# Patient Record
Sex: FEMALE | Race: WHITE | NOT HISPANIC OR LATINO | Employment: UNEMPLOYED | URBAN - METROPOLITAN AREA
[De-identification: names, ages, dates, MRNs, and addresses within clinical notes are randomized per-mention and may not be internally consistent; named-entity substitution may affect disease eponyms.]

---

## 2018-04-20 ENCOUNTER — HOSPITAL ENCOUNTER (EMERGENCY)
Facility: HOSPITAL | Age: 48
Discharge: HOME | End: 2018-04-20
Attending: EMERGENCY MEDICINE
Payer: COMMERCIAL

## 2018-04-20 VITALS
BODY MASS INDEX: 35.77 KG/M2 | RESPIRATION RATE: 18 BRPM | WEIGHT: 194.38 LBS | HEIGHT: 62 IN | TEMPERATURE: 98 F | DIASTOLIC BLOOD PRESSURE: 78 MMHG | SYSTOLIC BLOOD PRESSURE: 141 MMHG | OXYGEN SATURATION: 98 % | HEART RATE: 79 BPM

## 2018-04-20 DIAGNOSIS — Z72.0 TOBACCO USE: ICD-10-CM

## 2018-04-20 DIAGNOSIS — I10 ESSENTIAL HYPERTENSION: ICD-10-CM

## 2018-04-20 DIAGNOSIS — R07.89 ATYPICAL CHEST PAIN: Primary | ICD-10-CM

## 2018-04-20 LAB
ALBUMIN SERPL BCP-MCNC: 4.3 G/DL
ALP SERPL-CCNC: 109 U/L
ALT SERPL W/O P-5'-P-CCNC: 54 U/L
ANION GAP SERPL CALC-SCNC: 11 MMOL/L
AST SERPL-CCNC: 28 U/L
BASOPHILS # BLD AUTO: 0.04 K/UL
BASOPHILS NFR BLD: 0.4 %
BILIRUB SERPL-MCNC: 0.4 MG/DL
BNP SERPL-MCNC: <10 PG/ML
BUN SERPL-MCNC: 12 MG/DL
CALCIUM SERPL-MCNC: 10.2 MG/DL
CHLORIDE SERPL-SCNC: 103 MMOL/L
CO2 SERPL-SCNC: 23 MMOL/L
CREAT SERPL-MCNC: 0.7 MG/DL
DIFFERENTIAL METHOD: NORMAL
EOSINOPHIL # BLD AUTO: 0.5 K/UL
EOSINOPHIL NFR BLD: 5.4 %
ERYTHROCYTE [DISTWIDTH] IN BLOOD BY AUTOMATED COUNT: 14 %
EST. GFR  (AFRICAN AMERICAN): >60 ML/MIN/1.73 M^2
EST. GFR  (NON AFRICAN AMERICAN): >60 ML/MIN/1.73 M^2
GLUCOSE SERPL-MCNC: 84 MG/DL
HCT VFR BLD AUTO: 41.7 %
HGB BLD-MCNC: 13.8 G/DL
LYMPHOCYTES # BLD AUTO: 2.6 K/UL
LYMPHOCYTES NFR BLD: 26 %
MCH RBC QN AUTO: 30.3 PG
MCHC RBC AUTO-ENTMCNC: 33.1 G/DL
MCV RBC AUTO: 91 FL
MONOCYTES # BLD AUTO: 0.7 K/UL
MONOCYTES NFR BLD: 6.5 %
NEUTROPHILS # BLD AUTO: 6.2 K/UL
NEUTROPHILS NFR BLD: 61.4 %
PLATELET # BLD AUTO: 333 K/UL
PMV BLD AUTO: 11.5 FL
POTASSIUM SERPL-SCNC: 4 MMOL/L
PROT SERPL-MCNC: 8.6 G/DL
RBC # BLD AUTO: 4.56 M/UL
SODIUM SERPL-SCNC: 137 MMOL/L
TROPONIN I SERPL DL<=0.01 NG/ML-MCNC: <0.006 NG/ML
TROPONIN I SERPL DL<=0.01 NG/ML-MCNC: <0.006 NG/ML
WBC # BLD AUTO: 10.02 K/UL

## 2018-04-20 PROCEDURE — 93005 ELECTROCARDIOGRAM TRACING: CPT

## 2018-04-20 PROCEDURE — 25000003 PHARM REV CODE 250: Performed by: EMERGENCY MEDICINE

## 2018-04-20 PROCEDURE — 80053 COMPREHEN METABOLIC PANEL: CPT

## 2018-04-20 PROCEDURE — 93010 ELECTROCARDIOGRAM REPORT: CPT | Mod: ,,, | Performed by: INTERNAL MEDICINE

## 2018-04-20 PROCEDURE — 84484 ASSAY OF TROPONIN QUANT: CPT

## 2018-04-20 PROCEDURE — 83880 ASSAY OF NATRIURETIC PEPTIDE: CPT

## 2018-04-20 PROCEDURE — 25500020 PHARM REV CODE 255: Performed by: EMERGENCY MEDICINE

## 2018-04-20 PROCEDURE — 63600175 PHARM REV CODE 636 W HCPCS: Performed by: EMERGENCY MEDICINE

## 2018-04-20 PROCEDURE — 85025 COMPLETE CBC W/AUTO DIFF WBC: CPT

## 2018-04-20 PROCEDURE — 99284 EMERGENCY DEPT VISIT MOD MDM: CPT | Mod: 25

## 2018-04-20 PROCEDURE — 99900035 HC TECH TIME PER 15 MIN (STAT)

## 2018-04-20 PROCEDURE — 96374 THER/PROPH/DIAG INJ IV PUSH: CPT

## 2018-04-20 RX ORDER — ONDANSETRON 2 MG/ML
4 INJECTION INTRAMUSCULAR; INTRAVENOUS
Status: COMPLETED | OUTPATIENT
Start: 2018-04-20 | End: 2018-04-20

## 2018-04-20 RX ORDER — FAMOTIDINE 20 MG/1
20 TABLET, FILM COATED ORAL
Status: COMPLETED | OUTPATIENT
Start: 2018-04-20 | End: 2018-04-20

## 2018-04-20 RX ORDER — FAMOTIDINE 20 MG/1
20 TABLET, FILM COATED ORAL 2 TIMES DAILY
Qty: 60 TABLET | Refills: 0 | Status: SHIPPED | OUTPATIENT
Start: 2018-04-20 | End: 2019-12-04

## 2018-04-20 RX ORDER — QUETIAPINE FUMARATE 100 MG/1
TABLET, FILM COATED ORAL
COMMUNITY
End: 2019-12-04

## 2018-04-20 RX ORDER — LOSARTAN POTASSIUM 100 MG/1
100 TABLET ORAL DAILY
COMMUNITY
End: 2019-12-04

## 2018-04-20 RX ORDER — ONDANSETRON 4 MG/1
4 TABLET, ORALLY DISINTEGRATING ORAL EVERY 8 HOURS PRN
Qty: 12 TABLET | Refills: 0 | Status: SHIPPED | OUTPATIENT
Start: 2018-04-20 | End: 2019-12-04

## 2018-04-20 RX ADMIN — IOHEXOL 100 ML: 350 INJECTION, SOLUTION INTRAVENOUS at 05:04

## 2018-04-20 RX ADMIN — ONDANSETRON 4 MG: 2 INJECTION INTRAMUSCULAR; INTRAVENOUS at 03:04

## 2018-04-20 RX ADMIN — FAMOTIDINE 20 MG: 20 TABLET, FILM COATED ORAL at 07:04

## 2018-04-20 RX ADMIN — DICYCLOMINE HYDROCHLORIDE 50 ML: 10 SOLUTION ORAL at 06:04

## 2018-04-20 NOTE — ED NOTES
Pt ambulatory to restroom. Pt resting in bed. NAD, VSS, RR equal and unlabored. Will continue to monitor

## 2018-04-20 NOTE — ED NOTES
LOC: The patient is awake, alert and aware of environment with an appropriate affect, the patient is oriented x 3 and speaking appropriately.  APPEARANCE: Patient resting comfortably and in no acute distress, patient is clean and well groomed, patient's clothing is properly fastened.  HEENT: Brief WNL  SKIN: Brief WNL.   MUSCULOSKELETAL: Brief WNL except pt reports pain to back and left arm   RESPIRATORY: Brief WNL except productive cough   CARDIAC: Brief WNL except left side chest pain/pressure   GASTRO: Brief WNL except nausea   : Brief WNL  Peripheral Vasc: Brief WNL  NEURO: Brief WNL  PSYCH: Brief WNL

## 2018-04-20 NOTE — ED NOTES
Pt is aaoX4, laying on ER stretcher, resp e/u, VSS, nad. Pt is aware of poc, and she denies having any needs at this time. Bed locked in lowest position, side rails up X2, call bell in reach, family at the bedside. Will continue to monitor.

## 2018-04-20 NOTE — ED PROVIDER NOTES
Encounter Date: 2018       History     Chief Complaint   Patient presents with    Chest Pain     cough and congestion for 2 week tx steriods and antibotics . yest upper back pain and today at 6:30am substernal chest pain radiating to left arm , constant  9/10 - nausea     Patient currently presents with chief complaint of chest pain.  This began around 6AM this am and has not let up since then.  This is localized to the substernal region.  Patient reports shortness of breath, denies palpitations,  denies nausea, denies diaphoresis.  Radiation of pain:  left shoulder.  Patient reports aspirin use in the last 24 hours (BC powder). Denies daily use.  Patient denies history of known CAD.  Cardiac risk factors include:  hypertension, obesity, tobacco use.  There is a prior history of PE.          Review of patient's allergies indicates:  No Known Allergies  Past Medical History:   Diagnosis Date    Cervical cancer     History of pulmonary embolus (PE)     Hypertension      Past Surgical History:   Procedure Laterality Date     SECTION      CHOLECYSTECTOMY      HERNIA REPAIR      HYSTERECTOMY       History reviewed. No pertinent family history.  Social History   Substance Use Topics    Smoking status: Current Every Day Smoker     Packs/day: 0.50     Types: Cigarettes    Smokeless tobacco: Never Used    Alcohol use No     Review of Systems   Constitutional: Positive for fatigue. Negative for chills and fever.   HENT: Negative for congestion and rhinorrhea.    Respiratory: Positive for chest tightness and shortness of breath. Negative for cough and wheezing.    Cardiovascular: Positive for chest pain. Negative for palpitations and leg swelling.   Gastrointestinal: Negative for abdominal pain, constipation, diarrhea, nausea and vomiting.   Genitourinary: Negative for dysuria, frequency, urgency, vaginal bleeding and vaginal discharge.   Skin: Negative for color change and rash.   Allergic/Immunologic:  "Negative for immunocompromised state.   Neurological: Negative for dizziness, weakness and numbness.   Hematological: Negative for adenopathy. Does not bruise/bleed easily.   All other systems reviewed and are negative.      Physical Exam     Initial Vitals [04/20/18 1446]   BP Pulse Resp Temp SpO2   138/74 93 20 98.5 °F (36.9 °C) 100 %      MAP       95.33         Vitals:    04/20/18 1446 04/20/18 1454 04/20/18 1641 04/20/18 1821   BP: 138/74  132/61 (!) 146/70   Pulse: 93 88 79 71   Resp: 20 18 18   Temp: 98.5 °F (36.9 °C)      TempSrc: Oral      SpO2: 100%  100% 100%   Weight: 88.2 kg (194 lb 6 oz)      Height: 5' 2" (1.575 m)       04/20/18 1946   BP: (!) 141/78   Pulse: 79   Resp: 18   Temp: 98.1 °F (36.7 °C)   TempSrc: Oral   SpO2: 98%   Weight:    Height:          Physical Exam    Nursing note and vitals reviewed.  Constitutional: She appears well-developed and well-nourished. She is not diaphoretic. No distress.   HENT:   Head: Normocephalic and atraumatic.   Right Ear: External ear normal.   Left Ear: External ear normal.   Nose: Nose normal.   Mouth/Throat: Oropharynx is clear and moist.   Eyes: Conjunctivae and EOM are normal. Pupils are equal, round, and reactive to light. No scleral icterus.   Neck: Neck supple. No tracheal deviation present. No JVD present.   Cardiovascular: Normal rate, regular rhythm, normal heart sounds and intact distal pulses. Exam reveals no gallop and no friction rub.    No murmur heard.  Pulmonary/Chest: Breath sounds normal. No respiratory distress. She has no wheezes. She has no rhonchi. She has no rales.   Abdominal: Soft. Bowel sounds are normal. She exhibits no distension. There is no tenderness.   Musculoskeletal: Normal range of motion. She exhibits no edema.   Neurological: She is alert and oriented to person, place, and time. She has normal strength. No cranial nerve deficit or sensory deficit.   Skin: Skin is warm and dry. No rash noted.   Psychiatric: She has a " normal mood and affect. Her behavior is normal.         ED Course   Procedures  Labs Reviewed   COMPREHENSIVE METABOLIC PANEL - Abnormal; Notable for the following:        Result Value    Total Protein 8.6 (*)     ALT 54 (*)     All other components within normal limits   B-TYPE NATRIURETIC PEPTIDE   CBC W/ AUTO DIFFERENTIAL   TROPONIN I   TROPONIN I     EKG Readings: (Independently Interpreted)   Initial Reading: No STEMI. Rhythm: Normal Sinus Rhythm. Heart Rate: 84. Ectopy: No Ectopy. Conduction: Normal. Axis: Normal.     Imaging Results          CTA Chest Non-Coronary (PE Study) (Final result)  Result time 04/20/18 17:26:45    Final result by Meghan Herrera III, MD (04/20/18 17:26:45)                 Impression:      1. Significantly limited exam due to suboptimal contrast bolus timing.  Pulmonary embolism distal to the main pulmonary arteries cannot be excluded.  No large central pulmonary embolus is identified.  If the patient has a positive d-dimer, VQ scan or a 2nd dose of IV contrast for chest CTA could be performed.   2.  No acute cardiopulmonary disease identified.            Electronically signed by: MEGHAN HERRERA MD  Date:     04/20/18  Time:    17:26              Narrative:    CTA CHEST NON CORONARY    Clinical history: Chest pain, shortness of breath; history of pulmonary embolism.     Technique: Routine CT angiogram of the chest protocol performed after the IV administration of 100 mL Omnipaque 350. 3D reconstructions/reformats/MIPs obtained. All CT scans at this facility use dose modulation, iterative reconstruction, and/or weight based dosing when appropriate to reduce radiation dose to as low as reasonably achievable.    Comparison: No prior chest CT available.    FINDINGS: Significantly limited exam due to suboptimal contrast bolus timing without significant contrast opacification of the segmental and subsegmental pulmonary arteries.  Pulmonary embolism distal to the main pulmonary artery cannot  be excluded.  No large central pulmonary embolus is identified. No aortic aneurysm or dissection is identified.  There is no cardiomegaly or pericardial effusion.  No pathologically enlarged lymph nodes are seen in the chest.  The lungs are grossly clear without evidence of acute infiltrate, effusion, pneumothorax or other acute pulmonary disease.  No acute osseous abnormality is seen. Limited images through the upper abdomen demonstrate no acute findings.  Subcentimeter benign-appearing left adrenal nodules are noted.                             X-Ray Chest AP Portable (Final result)  Result time 04/20/18 15:30:00    Final result by Caleb Hagan MD (04/20/18 15:30:00)                 Impression:       No acute process seen.      Electronically signed by: CALEB HGAAN MD  Date:     04/20/18  Time:    15:30              Narrative:    Clinical Data:Chest pain    Comparison:  none    Findings:  Single view of the chest.      Cardiac silhouette is normal.  The lungs demonstrate no evidence of active disease.  No evidence of pleural effusion or pneumothorax.  Bones appear intact.                            Results for orders placed or performed during the hospital encounter of 04/20/18   Brain natriuretic peptide   Result Value Ref Range    BNP <10 0 - 99 pg/mL   CBC auto differential   Result Value Ref Range    WBC 10.02 3.90 - 12.70 K/uL    RBC 4.56 4.00 - 5.40 M/uL    Hemoglobin 13.8 12.0 - 16.0 g/dL    Hematocrit 41.7 37.0 - 48.5 %    MCV 91 82 - 98 fL    MCH 30.3 27.0 - 31.0 pg    MCHC 33.1 32.0 - 36.0 g/dL    RDW 14.0 11.5 - 14.5 %    Platelets 333 150 - 350 K/uL    MPV 11.5 9.2 - 12.9 fL    Gran # (ANC) 6.2 1.8 - 7.7 K/uL    Lymph # 2.6 1.0 - 4.8 K/uL    Mono # 0.7 0.3 - 1.0 K/uL    Eos # 0.5 0.0 - 0.5 K/uL    Baso # 0.04 0.00 - 0.20 K/uL    Gran% 61.4 38.0 - 73.0 %    Lymph% 26.0 18.0 - 48.0 %    Mono% 6.5 4.0 - 15.0 %    Eosinophil% 5.4 0.0 - 8.0 %    Basophil% 0.4 0.0 - 1.9 %    Differential Method  Automated    Comprehensive metabolic panel   Result Value Ref Range    Sodium 137 136 - 145 mmol/L    Potassium 4.0 3.5 - 5.1 mmol/L    Chloride 103 95 - 110 mmol/L    CO2 23 23 - 29 mmol/L    Glucose 84 70 - 110 mg/dL    BUN, Bld 12 6 - 20 mg/dL    Creatinine 0.7 0.5 - 1.4 mg/dL    Calcium 10.2 8.7 - 10.5 mg/dL    Total Protein 8.6 (H) 6.0 - 8.4 g/dL    Albumin 4.3 3.5 - 5.2 g/dL    Total Bilirubin 0.4 0.1 - 1.0 mg/dL    Alkaline Phosphatase 109 55 - 135 U/L    AST 28 10 - 40 U/L    ALT 54 (H) 10 - 44 U/L    Anion Gap 11 8 - 16 mmol/L    eGFR if African American >60.0 >60 mL/min/1.73 m^2    eGFR if non African American >60.0 >60 mL/min/1.73 m^2   Troponin I   Result Value Ref Range    Troponin I <0.006 0.000 - 0.026 ng/mL   Troponin I   Result Value Ref Range    Troponin I <0.006 0.000 - 0.026 ng/mL          Medical Decision Making:   ED Management:  Following discussion of all pertinent details presently available from the patient's encounter, the patient was transitioned into the care of  for ongoing evaluation and management as of 1800 pending repeat troponin levels.  Sb Zhang MD  5:54 PM        Additional MDM:   Heart Score:    History:          Slightly suspicious.  ECG:             Normal  Age:               Less than 45 years  Risk factors: >= 3 risk factors or history of atherosclerotic disease  Troponin:       Less than or equal to normal limit  Final Score: 2      MIROSLAVA Score:   Age over 65:                                    0.00   > or = to 3 CAD risk factors:           1.00  Established CAD:                            0.00  > or = to 2 anginal events in the past 24 hours: 0.00  Use of ASA in past 7 days:              0.00  Elevated Enzymes:                         0.00  ST Depression > or = to 0.05 mV:  0.00  MIROSLAVA score: 1        18:00 care from Dr. Zhang.       Medications   ondansetron injection 4 mg (4 mg Intravenous Given 4/20/18 4741)   omnipaque 350 iohexol 100 mL (100 mLs  "Intravenous Given 4/20/18 1715)   GI cocktail (mylanta 30 mL, lidocaine 2 % viscous 10 mL, dicyclomine 10 mL) 50 mL (50 mLs Oral Given 4/20/18 1807)   famotidine tablet 20 mg (20 mg Oral Given 4/20/18 1944)       7:36 PM Reassessment: Dr. Kulkarni reassessed the pt.  patient reportedly had some chest discomfort that started this morning when she woke up.  Is been continuous.  She describes it as a "bubble".  There is no radiation.  Patient denies any nausea, vomiting, diaphoresis or shortness of breath with it.  Patient does have prior history of PE.  This was after she was placed on birth control tablets 18 years ago.  Patient denies any pleuritic component of the chest pain, leg pain, leg swelling, prolonged travel, trauma to her legs.  Patient's cardiac risk factors include smoking, obesity, and hypertension.  There is no family history of premature coronary artery disease.  Patient denies any melena, hematochezia, peptic ulcer disease, GERD.  Patient denies any cough, congestion, difficulty breathing, leg pain, leg swelling, diaphoresis, or trauma.  The chest discomfort has been continuous since this morning.  She was given a GI cocktail which improved her pain from a 9 out of 10 to a 6 out of 10.    Gen.: Patient resting in bed.  Appears to be under no acute distress   Heart: Regular rate and rhythm   Lungs: Clear to auscultation   Abdomen: Soft no rebound or guarding no masses   Extremities: No calf pain or calf tenderness   Neuro cranial nerves II through XII intact     The pt is resting comfortably and is NAD.  Pt states their sx have improved.  Discussed findings of CT scan with no pulmonary embolism identified and large pulmonary vasculature.  Recommendations from radiologist were reviewed, however patient had no calf pain, No calf tenderness, prolonged travel, or estrogen replacement.  Current symptoms she states are different from when she had the pulmonary embolism.  There is no tachycardia or hypoxia " noted on vital signs.  Therefore d-dimer/VQ scan/repeat CT not clinically indicated.  I did discuss with the patient to return to the emergency room for worsening chest pain, shortness of breath, diaphoresis, or worsening condition.  Patient was also consult on quitting smoking.  Discussed test results, shared treatment plan, specific conditions for return, and the need for f/u.  Answered their questions at this time.  Pt understands and agrees to the plan.  The pt has remained hemodynamically stable through ED course and is stable for discharge.    Follow-up Information     Primary Care Provider of Choice In 2 days.    Why:  Return to the ED for:  chest pain, chest pressure, shortness of breath, difficulty breathing, breathing out in sweats, blood in stool or other concerns.  Contact information:  [phone] for appointment.     Dr. JOVANY Chavira.                 Discharge Medication List as of 4/20/2018  7:41 PM      START taking these medications    Details   famotidine (PEPCID) 20 MG tablet Take 1 tablet (20 mg total) by mouth 2 (two) times daily., Starting Fri 4/20/2018, Until Sun 5/20/2018, Print      ondansetron (ZOFRAN-ODT) 4 MG TbDL Take 1 tablet (4 mg total) by mouth every 8 (eight) hours as needed., Starting Fri 4/20/2018, Print              Discharge Medication List as of 4/20/2018  7:41 PM          I discussed with patient and/or family/caretaker that evaluation in the ED does not suggest any emergent or life threatening medical conditions requiring immediate intervention beyond what was provided in the ED, and I believe patient is safe for discharge.  Regardless, an unremarkable evaluation in the ED does not preclude the development or presence of a serious of life threatening condition. As such, patient was instructed to return immediately for any worsening or change in current symptoms.    Regarding TOBACCO USE DISORDER, patient was encouraged to:  make changes to lifestyle and habits to help reduce  craving for cigarettes; satisfy oral habits in other ways (eat celery or another low-calorie snack, chew sugarless gum, etc.); only frequent public places and restaurants where smoking is prohibited or restricted; eat regular meals and avoid sweet snacks or candy; and to exercise more frequently.  Patient was educated on setting goals for quitting and complications associated with tobacco use. Resources were provided to patient for smoking cessation opportunities offered in the community.                Clinical Impression:   The primary encounter diagnosis was Atypical chest pain. Diagnoses of Tobacco use and Essential hypertension were also pertinent to this visit.    Disposition:   Disposition: Discharged  Condition: Stable                        Jacque Kulkarni,   04/21/18 0302

## 2018-04-21 NOTE — ED NOTES
Dr. Kulkarni is at the bedside updating pt on test results and poc. Pt has verbalized understanding, and she denies having any further questions or concerns at this time. Pt will be discharged per md order.

## 2018-04-21 NOTE — DISCHARGE INSTRUCTIONS
Avoid red sauces, caffeine, peppermint, peppers,  alcohol, motrin/ibuprofen/advil/NSAIDS, and tobacco.      Eat a bland diet.    Return to the ED for:   Blood in stool, black tarry stool, dizziness, light-headedness, worsening pain, passing out, vomiting blood or other concerns.

## 2019-12-06 PROBLEM — F41.9 ANXIETY: Status: ACTIVE | Noted: 2019-12-06

## 2019-12-06 PROBLEM — E78.5 HYPERLIPIDEMIA: Status: ACTIVE | Noted: 2019-12-06

## 2019-12-06 PROBLEM — I10 BENIGN HYPERTENSION: Status: ACTIVE | Noted: 2019-12-06

## 2019-12-06 PROBLEM — F90.9 ADHD (ATTENTION DEFICIT HYPERACTIVITY DISORDER): Status: ACTIVE | Noted: 2019-12-06

## 2020-03-03 ENCOUNTER — TELEPHONE (OUTPATIENT)
Dept: RADIOLOGY | Facility: HOSPITAL | Age: 50
End: 2020-03-03

## 2020-03-03 PROBLEM — E55.9 VITAMIN D DEFICIENCY: Status: ACTIVE | Noted: 2020-03-03

## 2020-03-05 ENCOUNTER — TELEPHONE (OUTPATIENT)
Dept: RADIOLOGY | Facility: HOSPITAL | Age: 50
End: 2020-03-05

## 2020-03-06 ENCOUNTER — HOSPITAL ENCOUNTER (OUTPATIENT)
Dept: RADIOLOGY | Facility: HOSPITAL | Age: 50
Discharge: HOME | End: 2020-03-06
Attending: FAMILY MEDICINE
Payer: COMMERCIAL

## 2020-03-06 DIAGNOSIS — R11.0 NAUSEA: ICD-10-CM

## 2020-03-06 DIAGNOSIS — R31.9 HEMATURIA, UNSPECIFIED TYPE: ICD-10-CM

## 2020-03-06 DIAGNOSIS — R10.9 ABDOMINAL DISCOMFORT: ICD-10-CM

## 2020-03-06 PROCEDURE — 76700 US EXAM ABDOM COMPLETE: CPT | Mod: TC,PO

## 2020-03-06 PROCEDURE — 76700 US ABDOMEN COMPLETE: ICD-10-PCS | Mod: 26,,, | Performed by: RADIOLOGY

## 2020-03-06 PROCEDURE — 76700 US EXAM ABDOM COMPLETE: CPT | Mod: 26,,, | Performed by: RADIOLOGY

## 2024-11-20 ENCOUNTER — HOSPITAL ENCOUNTER (OUTPATIENT)
Facility: HOSPITAL | Age: 54
Discharge: HOME | End: 2024-11-22
Attending: EMERGENCY MEDICINE | Admitting: HOSPITALIST
Payer: COMMERCIAL

## 2024-11-20 DIAGNOSIS — R19.7 NAUSEA VOMITING AND DIARRHEA: ICD-10-CM

## 2024-11-20 DIAGNOSIS — Z77.29 EXPOSURE TO NATURAL GAS: ICD-10-CM

## 2024-11-20 DIAGNOSIS — R07.9 CHEST PAIN: Primary | ICD-10-CM

## 2024-11-20 DIAGNOSIS — R11.2 NAUSEA VOMITING AND DIARRHEA: ICD-10-CM

## 2024-11-20 DIAGNOSIS — R11.2 INTRACTABLE NAUSEA AND VOMITING: ICD-10-CM

## 2024-11-20 DIAGNOSIS — R25.2 MUSCLE CRAMPS: ICD-10-CM

## 2024-11-20 LAB
ALBUMIN SERPL BCP-MCNC: 4.1 G/DL (ref 3.5–5.2)
ALLENS TEST: ABNORMAL
ALP SERPL-CCNC: 95 U/L (ref 40–150)
ALT SERPL W/O P-5'-P-CCNC: 23 U/L (ref 10–44)
ANION GAP SERPL CALC-SCNC: 9 MMOL/L (ref 8–16)
ASCENDING AORTA: 3.05 CM
AST SERPL-CCNC: 19 U/L (ref 10–40)
AV AREA BY CONTINUOUS VTI: 3.6 CM2
AV INDEX (PROSTH): 1.08
AV LVOT MEAN GRADIENT: 1 MMHG
AV LVOT PEAK GRADIENT: 2 MMHG
AV MEAN GRADIENT: 1.4 MMHG
AV PEAK GRADIENT: 2.6 MMHG
AV VALVE AREA BY VELOCITY RATIO: 3.5 CM²
AV VALVE AREA: 3.7 CM2
AV VELOCITY RATIO: 1
BACTERIA #/AREA URNS AUTO: ABNORMAL /HPF
BASOPHILS # BLD AUTO: 0.04 K/UL (ref 0–0.2)
BASOPHILS NFR BLD: 0.3 % (ref 0–1.9)
BILIRUB SERPL-MCNC: 0.4 MG/DL (ref 0.1–1)
BILIRUB UR QL STRIP: NEGATIVE
BNP SERPL-MCNC: <10 PG/ML (ref 0–99)
BSA FOR ECHO PROCEDURE: 1.87 M2
BUN SERPL-MCNC: 13 MG/DL (ref 6–20)
CALCIUM SERPL-MCNC: 10 MG/DL (ref 8.7–10.5)
CARBOXYHEMOGLOBIN: 3.2 % (ref 1.5–5)
CHLORIDE SERPL-SCNC: 101 MMOL/L (ref 95–110)
CK SERPL-CCNC: 56 U/L (ref 20–180)
CLARITY UR REFRACT.AUTO: ABNORMAL
CO2 SERPL-SCNC: 25 MMOL/L (ref 23–29)
COLOR UR AUTO: YELLOW
CREAT SERPL-MCNC: 0.9 MG/DL (ref 0.5–1.4)
CV ECHO LV RWT: 0.34 CM
CV STRESS BASE HR: 87 BPM
D DIMER PPP IA.FEU-MCNC: 0.57 MG/L FEU
DIASTOLIC BLOOD PRESSURE: 69 MMHG
DIFFERENTIAL METHOD BLD: ABNORMAL
DOP CALC AO PEAK VEL: 0.8 M/S
DOP CALC AO VTI: 11.2 CM
DOP CALC LVOT AREA: 3.5 CM2
DOP CALC LVOT DIAMETER: 2.1 CM
DOP CALC LVOT PEAK VEL: 0.8 M/S
DOP CALC LVOT STROKE VOLUME: 41.9 CM3
DOP CALCLVOT PEAK VEL VTI: 12.1 CM
E WAVE DECELERATION TIME: 229.29 MS
E/A RATIO: 0.67
E/E' RATIO: 6.3 M/S
ECHO EF ESTIMATED: 38 %
ECHO LV POSTERIOR WALL: 0.8 CM (ref 0.6–1.1)
EOSINOPHIL # BLD AUTO: 0.3 K/UL (ref 0–0.5)
EOSINOPHIL NFR BLD: 2.2 % (ref 0–8)
ERYTHROCYTE [DISTWIDTH] IN BLOOD BY AUTOMATED COUNT: 13.4 % (ref 11.5–14.5)
EST. GFR  (NO RACE VARIABLE): >60 ML/MIN/1.73 M^2
FRACTIONAL SHORTENING: 17 % (ref 28–44)
GLUCOSE SERPL-MCNC: 93 MG/DL (ref 70–110)
GLUCOSE UR QL STRIP: NEGATIVE
HCO3 UR-SCNC: 28.9 MMOL/L (ref 24–28)
HCT VFR BLD AUTO: 43.2 % (ref 37–48.5)
HCV AB SERPL QL IA: NORMAL
HGB BLD-MCNC: 14.1 G/DL (ref 12–16)
HGB UR QL STRIP: ABNORMAL
HIV 1+2 AB+HIV1 P24 AG SERPL QL IA: NORMAL
HYALINE CASTS UR QL AUTO: 3 /LPF
IMM GRANULOCYTES # BLD AUTO: 0.05 K/UL (ref 0–0.04)
IMM GRANULOCYTES NFR BLD AUTO: 0.4 % (ref 0–0.5)
INFLUENZA A, MOLECULAR: NEGATIVE
INFLUENZA B, MOLECULAR: NEGATIVE
INTERVENTRICULAR SEPTUM: 0.7 CM (ref 0.6–1.1)
KETONES UR QL STRIP: ABNORMAL
LA MAJOR: 3.17 CM
LA MINOR: 3.87 CM
LA WIDTH: 2.48 CM
LEFT ATRIUM SIZE: 2.72 CM
LEFT ATRIUM VOLUME INDEX MOD: 12.7 ML/M2
LEFT ATRIUM VOLUME INDEX: 11.1 ML/M2
LEFT ATRIUM VOLUME MOD: 22.87 ML
LEFT ATRIUM VOLUME: 19.98 CM3
LEFT INTERNAL DIMENSION IN SYSTOLE: 3.9 CM (ref 2.1–4)
LEFT VENTRICLE DIASTOLIC VOLUME INDEX: 57.89 ML/M2
LEFT VENTRICLE DIASTOLIC VOLUME: 104.21 ML
LEFT VENTRICLE MASS INDEX: 62.5 G/M2
LEFT VENTRICLE SYSTOLIC VOLUME INDEX: 35.6 ML/M2
LEFT VENTRICLE SYSTOLIC VOLUME: 64.11 ML
LEFT VENTRICULAR INTERNAL DIMENSION IN DIASTOLE: 4.7 CM (ref 3.5–6)
LEFT VENTRICULAR MASS: 112.5 G
LEUKOCYTE ESTERASE UR QL STRIP: NEGATIVE
LIPASE SERPL-CCNC: 11 U/L (ref 4–60)
LV LATERAL E/E' RATIO: 4.85
LV SEPTAL E/E' RATIO: 9
LYMPHOCYTES # BLD AUTO: 2.2 K/UL (ref 1–4.8)
LYMPHOCYTES NFR BLD: 18.7 % (ref 18–48)
MCH RBC QN AUTO: 30.5 PG (ref 27–31)
MCHC RBC AUTO-ENTMCNC: 32.6 G/DL (ref 32–36)
MCV RBC AUTO: 94 FL (ref 82–98)
MICROSCOPIC COMMENT: ABNORMAL
MONOCYTES # BLD AUTO: 0.8 K/UL (ref 0.3–1)
MONOCYTES NFR BLD: 6.3 % (ref 4–15)
MV PEAK A VEL: 0.94 M/S
MV PEAK E VEL: 0.63 M/S
NEUTROPHILS # BLD AUTO: 8.6 K/UL (ref 1.8–7.7)
NEUTROPHILS NFR BLD: 72.1 % (ref 38–73)
NITRITE UR QL STRIP: NEGATIVE
NRBC BLD-RTO: 0 /100 WBC
OHS CV CPX 1 MINUTE RECOVERY HEART RATE: 129 BPM
OHS CV CPX 85 PERCENT MAX PREDICTED HEART RATE MALE: 142
OHS CV CPX ESTIMATED METS: 10
OHS CV CPX MAX PREDICTED HEART RATE: 167
OHS CV CPX PATIENT IS FEMALE: 1
OHS CV CPX PATIENT IS MALE: 0
OHS CV CPX PEAK DIASTOLIC BLOOD PRESSURE: 61 MMHG
OHS CV CPX PEAK HEAR RATE: 139 BPM
OHS CV CPX PEAK RATE PRESSURE PRODUCT: ABNORMAL
OHS CV CPX PEAK SYSTOLIC BLOOD PRESSURE: 150 MMHG
OHS CV CPX PERCENT MAX PREDICTED HEART RATE ACHIEVED: 87
OHS CV CPX RATE PRESSURE PRODUCT PRESENTING: ABNORMAL
OHS CV RV/LV RATIO: 0.81 CM
OHS QRS DURATION: 80 MS
OHS QRS DURATION: 80 MS
OHS QRS DURATION: 82 MS
OHS QTC CALCULATION: 442 MS
OHS QTC CALCULATION: 452 MS
OHS QTC CALCULATION: 463 MS
PCO2 BLDA: 46.6 MMHG (ref 35–45)
PH SMN: 7.4 [PH] (ref 7.35–7.45)
PH UR STRIP: 6 [PH] (ref 5–8)
PLATELET # BLD AUTO: 325 K/UL (ref 150–450)
PMV BLD AUTO: 11.6 FL (ref 9.2–12.9)
PO2 BLDA: 27 MMHG (ref 40–60)
POC BE: 4 MMOL/L
POC SATURATED O2: 51 % (ref 95–100)
POC TCO2: 30 MMOL/L (ref 24–29)
POST STRESS EJECTION FRACTION: 75 %
POTASSIUM SERPL-SCNC: 4.3 MMOL/L (ref 3.5–5.1)
PROT SERPL-MCNC: 8 G/DL (ref 6–8.4)
PROT UR QL STRIP: ABNORMAL
RA MAJOR: 3.43 CM
RA PRESSURE ESTIMATED: 3 MMHG
RA WIDTH: 2.86 CM
RBC # BLD AUTO: 4.62 M/UL (ref 4–5.4)
RBC #/AREA URNS AUTO: 4 /HPF (ref 0–4)
RIGHT ATRIAL AREA: 9 CM2
RIGHT VENTRICLE DIASTOLIC BASEL DIMENSION: 3.8 CM
RV TISSUE DOPPLER FREE WALL SYSTOLIC VELOCITY 1 (APICAL 4 CHAMBER VIEW): 12.44 CM/S
SAMPLE: ABNORMAL
SARS-COV-2 RDRP RESP QL NAA+PROBE: NEGATIVE
SINUS: 3.07 CM
SITE: ABNORMAL
SODIUM SERPL-SCNC: 135 MMOL/L (ref 136–145)
SP GR UR STRIP: 1.01 (ref 1–1.03)
SPECIMEN SOURCE: NORMAL
SQUAMOUS #/AREA URNS AUTO: 11 /HPF
STJ: 2.84 CM
STRESS ECHO POST EXERCISE DUR MIN: 6 MINUTES
STRESS ECHO POST EXERCISE DUR SEC: 29 SECONDS
SYSTOLIC BLOOD PRESSURE: 119 MMHG
TDI LATERAL: 0.13 M/S
TDI SEPTAL: 0.07 M/S
TDI: 0.1 M/S
TRICUSPID ANNULAR PLANE SYSTOLIC EXCURSION: 1.96 CM
TROPONIN I SERPL DL<=0.01 NG/ML-MCNC: 0.01 NG/ML (ref 0–0.03)
TROPONIN I SERPL DL<=0.01 NG/ML-MCNC: <0.006 NG/ML (ref 0–0.03)
TROPONIN I SERPL DL<=0.01 NG/ML-MCNC: <0.006 NG/ML (ref 0–0.03)
URN SPEC COLLECT METH UR: ABNORMAL
WBC # BLD AUTO: 11.88 K/UL (ref 3.9–12.7)
WBC #/AREA URNS AUTO: 2 /HPF (ref 0–5)
Z-SCORE OF LEFT VENTRICULAR DIMENSION IN END DIASTOLE: -0.57
Z-SCORE OF LEFT VENTRICULAR DIMENSION IN END SYSTOLE: 1.88

## 2024-11-20 PROCEDURE — 96375 TX/PRO/DX INJ NEW DRUG ADDON: CPT

## 2024-11-20 PROCEDURE — 96361 HYDRATE IV INFUSION ADD-ON: CPT

## 2024-11-20 PROCEDURE — 93005 ELECTROCARDIOGRAM TRACING: CPT

## 2024-11-20 PROCEDURE — 25500020 PHARM REV CODE 255: Performed by: EMERGENCY MEDICINE

## 2024-11-20 PROCEDURE — 87635 SARS-COV-2 COVID-19 AMP PRB: CPT | Performed by: PHYSICIAN ASSISTANT

## 2024-11-20 PROCEDURE — 80053 COMPREHEN METABOLIC PANEL: CPT

## 2024-11-20 PROCEDURE — 25000003 PHARM REV CODE 250: Performed by: PHYSICIAN ASSISTANT

## 2024-11-20 PROCEDURE — 85379 FIBRIN DEGRADATION QUANT: CPT | Performed by: PHYSICIAN ASSISTANT

## 2024-11-20 PROCEDURE — 99900035 HC TECH TIME PER 15 MIN (STAT)

## 2024-11-20 PROCEDURE — 84484 ASSAY OF TROPONIN QUANT: CPT

## 2024-11-20 PROCEDURE — 63600175 PHARM REV CODE 636 W HCPCS: Performed by: PHYSICIAN ASSISTANT

## 2024-11-20 PROCEDURE — G0378 HOSPITAL OBSERVATION PER HR: HCPCS

## 2024-11-20 PROCEDURE — 84484 ASSAY OF TROPONIN QUANT: CPT | Mod: 91 | Performed by: EMERGENCY MEDICINE

## 2024-11-20 PROCEDURE — 93010 ELECTROCARDIOGRAM REPORT: CPT | Mod: ,,, | Performed by: INTERNAL MEDICINE

## 2024-11-20 PROCEDURE — 83880 ASSAY OF NATRIURETIC PEPTIDE: CPT

## 2024-11-20 PROCEDURE — 87502 INFLUENZA DNA AMP PROBE: CPT | Performed by: PHYSICIAN ASSISTANT

## 2024-11-20 PROCEDURE — 83690 ASSAY OF LIPASE: CPT | Performed by: PHYSICIAN ASSISTANT

## 2024-11-20 PROCEDURE — 82803 BLOOD GASES ANY COMBINATION: CPT

## 2024-11-20 PROCEDURE — 25000003 PHARM REV CODE 250

## 2024-11-20 PROCEDURE — 93010 ELECTROCARDIOGRAM REPORT: CPT | Mod: 76,,, | Performed by: INTERNAL MEDICINE

## 2024-11-20 PROCEDURE — 82550 ASSAY OF CK (CPK): CPT | Performed by: PHYSICIAN ASSISTANT

## 2024-11-20 PROCEDURE — 99285 EMERGENCY DEPT VISIT HI MDM: CPT | Mod: 25

## 2024-11-20 PROCEDURE — 87389 HIV-1 AG W/HIV-1&-2 AB AG IA: CPT | Performed by: PHYSICIAN ASSISTANT

## 2024-11-20 PROCEDURE — 86803 HEPATITIS C AB TEST: CPT | Performed by: PHYSICIAN ASSISTANT

## 2024-11-20 PROCEDURE — 25000242 PHARM REV CODE 250 ALT 637 W/ HCPCS: Performed by: PHYSICIAN ASSISTANT

## 2024-11-20 PROCEDURE — 81001 URINALYSIS AUTO W/SCOPE: CPT | Performed by: PHYSICIAN ASSISTANT

## 2024-11-20 PROCEDURE — 85025 COMPLETE CBC W/AUTO DIFF WBC: CPT

## 2024-11-20 PROCEDURE — 96374 THER/PROPH/DIAG INJ IV PUSH: CPT

## 2024-11-20 PROCEDURE — 96376 TX/PRO/DX INJ SAME DRUG ADON: CPT

## 2024-11-20 RX ORDER — IBUPROFEN 200 MG
1 TABLET ORAL DAILY
COMMUNITY

## 2024-11-20 RX ORDER — LIDOCAINE HYDROCHLORIDE 20 MG/ML
15 SOLUTION OROPHARYNGEAL ONCE
Status: COMPLETED | OUTPATIENT
Start: 2024-11-20 | End: 2024-11-20

## 2024-11-20 RX ORDER — DROPERIDOL 2.5 MG/ML
0.62 INJECTION, SOLUTION INTRAMUSCULAR; INTRAVENOUS
Status: DISCONTINUED | OUTPATIENT
Start: 2024-11-20 | End: 2024-11-20

## 2024-11-20 RX ORDER — ONDANSETRON HYDROCHLORIDE 2 MG/ML
4 INJECTION, SOLUTION INTRAVENOUS
Status: COMPLETED | OUTPATIENT
Start: 2024-11-20 | End: 2024-11-20

## 2024-11-20 RX ORDER — MORPHINE SULFATE 4 MG/ML
4 INJECTION, SOLUTION INTRAMUSCULAR; INTRAVENOUS EVERY 4 HOURS PRN
Status: COMPLETED | OUTPATIENT
Start: 2024-11-20 | End: 2024-11-22

## 2024-11-20 RX ORDER — ONDANSETRON HYDROCHLORIDE 2 MG/ML
4 INJECTION, SOLUTION INTRAVENOUS EVERY 8 HOURS PRN
Status: DISCONTINUED | OUTPATIENT
Start: 2024-11-20 | End: 2024-11-22 | Stop reason: HOSPADM

## 2024-11-20 RX ORDER — GABAPENTIN 300 MG/1
300 CAPSULE ORAL EVERY 12 HOURS
Status: DISCONTINUED | OUTPATIENT
Start: 2024-11-20 | End: 2024-11-22 | Stop reason: HOSPADM

## 2024-11-20 RX ORDER — AMITRIPTYLINE HYDROCHLORIDE 50 MG/1
50 TABLET, FILM COATED ORAL NIGHTLY
Status: DISCONTINUED | OUTPATIENT
Start: 2024-11-20 | End: 2024-11-22 | Stop reason: HOSPADM

## 2024-11-20 RX ORDER — LIDOCAINE 50 MG/G
2 PATCH TOPICAL
Status: COMPLETED | OUTPATIENT
Start: 2024-11-20 | End: 2024-11-21

## 2024-11-20 RX ORDER — SUCRALFATE 1 G/10ML
1 SUSPENSION ORAL EVERY 6 HOURS
Status: DISCONTINUED | OUTPATIENT
Start: 2024-11-20 | End: 2024-11-22 | Stop reason: HOSPADM

## 2024-11-20 RX ORDER — ALUMINUM HYDROXIDE, MAGNESIUM HYDROXIDE, AND SIMETHICONE 1200; 120; 1200 MG/30ML; MG/30ML; MG/30ML
30 SUSPENSION ORAL ONCE
Status: COMPLETED | OUTPATIENT
Start: 2024-11-20 | End: 2024-11-20

## 2024-11-20 RX ORDER — AMITRIPTYLINE HYDROCHLORIDE 50 MG/1
50 TABLET, FILM COATED ORAL NIGHTLY
COMMUNITY

## 2024-11-20 RX ORDER — ASPIRIN 325 MG
325 TABLET, DELAYED RELEASE (ENTERIC COATED) ORAL
Status: COMPLETED | OUTPATIENT
Start: 2024-11-20 | End: 2024-11-20

## 2024-11-20 RX ORDER — GABAPENTIN 300 MG/1
300 CAPSULE ORAL EVERY 12 HOURS
COMMUNITY

## 2024-11-20 RX ORDER — QUETIAPINE FUMARATE 25 MG/1
50 TABLET, FILM COATED ORAL NIGHTLY
Status: DISCONTINUED | OUTPATIENT
Start: 2024-11-20 | End: 2024-11-22 | Stop reason: HOSPADM

## 2024-11-20 RX ORDER — PROCHLORPERAZINE EDISYLATE 5 MG/ML
5 INJECTION INTRAMUSCULAR; INTRAVENOUS EVERY 6 HOURS PRN
Status: DISCONTINUED | OUTPATIENT
Start: 2024-11-20 | End: 2024-11-22 | Stop reason: HOSPADM

## 2024-11-20 RX ORDER — SODIUM CHLORIDE 0.9 % (FLUSH) 0.9 %
10 SYRINGE (ML) INJECTION
Status: DISCONTINUED | OUTPATIENT
Start: 2024-11-20 | End: 2024-11-22 | Stop reason: HOSPADM

## 2024-11-20 RX ORDER — ACETAMINOPHEN 325 MG/1
650 TABLET ORAL EVERY 6 HOURS PRN
Status: DISCONTINUED | OUTPATIENT
Start: 2024-11-20 | End: 2024-11-21

## 2024-11-20 RX ORDER — TALC
6 POWDER (GRAM) TOPICAL NIGHTLY PRN
Status: DISCONTINUED | OUTPATIENT
Start: 2024-11-20 | End: 2024-11-22 | Stop reason: HOSPADM

## 2024-11-20 RX ORDER — NITROGLYCERIN 0.4 MG/1
0.4 TABLET SUBLINGUAL EVERY 5 MIN PRN
Status: DISCONTINUED | OUTPATIENT
Start: 2024-11-20 | End: 2024-11-22 | Stop reason: HOSPADM

## 2024-11-20 RX ORDER — ACETAMINOPHEN 500 MG
1000 TABLET ORAL
Status: COMPLETED | OUTPATIENT
Start: 2024-11-20 | End: 2024-11-20

## 2024-11-20 RX ORDER — KETOROLAC TROMETHAMINE 30 MG/ML
10 INJECTION, SOLUTION INTRAMUSCULAR; INTRAVENOUS
Status: COMPLETED | OUTPATIENT
Start: 2024-11-20 | End: 2024-11-20

## 2024-11-20 RX ORDER — SPIRONOLACTONE 50 MG/1
100 TABLET, FILM COATED ORAL NIGHTLY
Status: DISCONTINUED | OUTPATIENT
Start: 2024-11-20 | End: 2024-11-21

## 2024-11-20 RX ORDER — MELOXICAM 15 MG/1
15 TABLET ORAL DAILY PRN
COMMUNITY
Start: 2024-07-22 | End: 2024-11-20

## 2024-11-20 RX ORDER — MULTIVITAMIN
1 TABLET ORAL EVERY MORNING
COMMUNITY

## 2024-11-20 RX ORDER — QUETIAPINE FUMARATE 50 MG/1
50 TABLET, FILM COATED ORAL NIGHTLY
COMMUNITY
Start: 2024-08-27

## 2024-11-20 RX ORDER — METHOCARBAMOL 500 MG/1
500 TABLET, FILM COATED ORAL 3 TIMES DAILY PRN
Status: DISCONTINUED | OUTPATIENT
Start: 2024-11-20 | End: 2024-11-22 | Stop reason: HOSPADM

## 2024-11-20 RX ADMIN — GABAPENTIN 300 MG: 300 CAPSULE ORAL at 08:11

## 2024-11-20 RX ADMIN — ONDANSETRON 4 MG: 2 INJECTION INTRAMUSCULAR; INTRAVENOUS at 11:11

## 2024-11-20 RX ADMIN — LIDOCAINE HYDROCHLORIDE 15 ML: 20 SOLUTION ORAL at 05:11

## 2024-11-20 RX ADMIN — SODIUM CHLORIDE, POTASSIUM CHLORIDE, SODIUM LACTATE AND CALCIUM CHLORIDE 1000 ML: 600; 310; 30; 20 INJECTION, SOLUTION INTRAVENOUS at 11:11

## 2024-11-20 RX ADMIN — AMITRIPTYLINE HYDROCHLORIDE 50 MG: 50 TABLET ORAL at 08:11

## 2024-11-20 RX ADMIN — SODIUM CHLORIDE, POTASSIUM CHLORIDE, SODIUM LACTATE AND CALCIUM CHLORIDE 1000 ML: 600; 310; 30; 20 INJECTION, SOLUTION INTRAVENOUS at 09:11

## 2024-11-20 RX ADMIN — ONDANSETRON 4 MG: 2 INJECTION INTRAMUSCULAR; INTRAVENOUS at 09:11

## 2024-11-20 RX ADMIN — MORPHINE SULFATE 4 MG: 4 INJECTION INTRAVENOUS at 09:11

## 2024-11-20 RX ADMIN — NITROGLYCERIN 0.4 MG: 0.4 TABLET, ORALLY DISINTEGRATING SUBLINGUAL at 02:11

## 2024-11-20 RX ADMIN — KETOROLAC TROMETHAMINE 10 MG: 30 INJECTION, SOLUTION INTRAMUSCULAR; INTRAVENOUS at 05:11

## 2024-11-20 RX ADMIN — LIDOCAINE 2 PATCH: 50 PATCH CUTANEOUS at 08:11

## 2024-11-20 RX ADMIN — HUMAN ALBUMIN MICROSPHERES AND PERFLUTREN 0.66 MG: 10; .22 INJECTION, SOLUTION INTRAVENOUS at 03:11

## 2024-11-20 RX ADMIN — QUETIAPINE FUMARATE 50 MG: 25 TABLET ORAL at 08:11

## 2024-11-20 RX ADMIN — IOHEXOL 75 ML: 350 INJECTION, SOLUTION INTRAVENOUS at 09:11

## 2024-11-20 RX ADMIN — ASPIRIN 325 MG: 325 TABLET, COATED ORAL at 07:11

## 2024-11-20 RX ADMIN — ALUMINUM HYDROXIDE, MAGNESIUM HYDROXIDE, AND SIMETHICONE 30 ML: 200; 200; 20 SUSPENSION ORAL at 05:11

## 2024-11-20 RX ADMIN — ACETAMINOPHEN 1000 MG: 500 TABLET ORAL at 06:11

## 2024-11-20 RX ADMIN — SPIRONOLACTONE 100 MG: 50 TABLET ORAL at 08:11

## 2024-11-20 RX ADMIN — SUCRALFATE 1 G: 1 SUSPENSION ORAL at 09:11

## 2024-11-20 NOTE — ED NOTES
Pt. Informed by RN that she is going for stress test @ 1345 and is NPO after 11:45.  Pt. Offered food and refused.  She showed verbal understanding at this time.  RN will continue to monitor.

## 2024-11-20 NOTE — ED NOTES
Bed: ADM 03  Expected date: 11/20/24  Expected time: 10:09 AM  Means of arrival:   Comments:   Alivia

## 2024-11-20 NOTE — ED NOTES
Pt. Undressed and placed in hospital gown.  Cardiac monitor, BP Cuff, and Oxygen Sensor applied to finger. Pillow placed for comfort.  Blankets applied.

## 2024-11-20 NOTE — ED NOTES
Assumed care of the patient. Pt in hospital gown, side rails up X2, bed low and locked. No family/visitors at bedside at this time. Pt denies any complaints or needs.

## 2024-11-20 NOTE — ED TRIAGE NOTES
"Maricruz RODRIGUEZ, a 54 y.o. female presents to the ED w/ complaint of chest pain/diarrhea/vomiting that started on Sunday. Pt reports the pain as constant and stabbing in nature. Pain radiates down left arm to back. Pt also reports some cramping in toes and fingers.     Triage note:  Chief Complaint   Patient presents with    Chest Pain     Starting last night at 12.  Diarrhea and vomiting for last 3 days "can't keep anything down".  Endorses muscle cramps.      Review of patient's allergies indicates:   Allergen Reactions    Penicillins      Past Medical History:   Diagnosis Date    ADHD (attention deficit hyperactivity disorder)     Asthma     Cervical cancer     Glaucoma     Hepatitis     History of pulmonary embolus (PE)     Hyperlipidemia     Hypertension     Obesity     Vitamin D deficiency        "

## 2024-11-20 NOTE — ED NOTES
PA aware of 8/10 chest pain.   Electrodesiccation And Curettage Text: The wound bed was treated with electrodesiccation and curettage after the biopsy was performed. Additional Anesthesia Volume In Cc (Will Not Render If 0): 0 Detail Level: Detailed Dressing: bandage Anesthesia Volume In Cc (Will Not Render If 0): 2 Type Of Destruction Used: Curettage Hemostasis: Drysol Cryotherapy Text: The wound bed was treated with cryotherapy after the biopsy was performed. Biopsy Type: H and E Silver Nitrate Text: The wound bed was treated with silver nitrate after the biopsy was performed. Electrodesiccation Text: The wound bed was treated with electrodesiccation after the biopsy was performed. Bill For Surgical Tray: no Wound Care: Aquaphor Biopsy Method: Dermablade Billing Type: Third-Party Bill Destruction After The Procedure: Yes Anesthesia Type: 1% lidocaine with epinephrine

## 2024-11-20 NOTE — ED PROVIDER NOTES
"Encounter Date: 11/20/2024       History     Chief Complaint   Patient presents with    Chest Pain     Starting last night at 12.  Diarrhea and vomiting for last 3 days "can't keep anything down".  Endorses muscle cramps.      The patient is a 54 year old female. She has a documented past medical history of HTN/HLD (stopped meds after weight loss through diet), obesity, PE after childbirth in her 20's not currently on AC, asthma, cervical cancer s/p hysterectomy, renal cell carcinoma s/p right partial nephrectomy, colitis, kidney stones, insomnia, neuropathy, and tobacco use (quit 3 weeks ago).     She presents to the ER for an emergent evaluation with a chief complaint of chest pain. She states that she has had this chest pain in addition to nausea, vomiting, and diarrhea for 3 days, symptom onset on Sunday. She states that she recently moved to [place] from [place] with her , and that on Sunday she was hooking up a gas clothes dryer in her new place and began smelling gas fumes. She states that she became very nauseated and shortly thereafter began having the N/V/D and chest pain. She states that she has been having symptoms daily ever since. She states that yesterday she began having her fingers and toes "cramp up" and she was concerned that she was dehydrated. She states that she thought she was better yesterday evening and went out for tacos, but vomited right after eating. She states that around midnight last night, the chest pain became worse, prompting her to drive herself to the ER this morning. She describes the pain as constant in course, moderate in degree, and sharp/stabbing in nature. She states that the pain seems to radiate to her back and down her left arm. She denies ever experiencing any coughing, wheezes, SOB, at any point. She states that she has had multiple ER visits for chest pain while living in [place] in the past, but states that she has never had a stress test in the past. "       Review of patient's allergies indicates:   Allergen Reactions    Penicillins     Sulfa (sulfonamide antibiotics) Itching     Past Medical History:   Diagnosis Date    ADHD (attention deficit hyperactivity disorder)     Asthma     Cervical cancer     Colitis determined by colorectal biopsy     History of pulmonary embolus (PE)     Hyperlipidemia     Hypertension     Insomnia     Kidney stone     Neuropathy     Obesity     Renal cell carcinoma     Vitamin D deficiency      Past Surgical History:   Procedure Laterality Date     SECTION      CHOLECYSTECTOMY      HERNIA REPAIR      HYSTERECTOMY      PARTIAL NEPHRECTOMY Right      Family History   Problem Relation Name Age of Onset    Hyperlipidemia Mother      Hypertension Mother      Diabetes Mother      Heart disease Mother      Heart attack Mother      Arthritis Father      Heart disease Father      Diabetes Father       Social History     Tobacco Use    Smoking status: Every Day     Current packs/day: 0.50     Types: Cigarettes    Smokeless tobacco: Never   Substance Use Topics    Alcohol use: No    Drug use: No     Review of Systems   Constitutional:  Negative for chills, diaphoresis and fever.   HENT:  Negative for congestion, rhinorrhea and sore throat.    Eyes:  Negative for pain and visual disturbance.   Respiratory:  Negative for cough, chest tightness and shortness of breath.    Cardiovascular:  Positive for chest pain.   Gastrointestinal:  Positive for diarrhea, nausea and vomiting. Negative for abdominal pain, blood in stool and constipation.   Genitourinary:  Negative for decreased urine volume, difficulty urinating, dysuria, flank pain, frequency, pelvic pain and urgency.   Musculoskeletal:  Positive for back pain and myalgias. Negative for gait problem and neck pain.   Skin:  Negative for color change and rash.   Allergic/Immunologic: Negative for immunocompromised state.   Neurological:  Negative for dizziness, seizures, syncope, speech  difficulty, weakness, light-headedness, numbness and headaches.   Psychiatric/Behavioral:  Negative for confusion.        Physical Exam     Initial Vitals [11/20/24 0702]   BP Pulse Resp Temp SpO2   115/78 (!) 111 18 98.7 °F (37.1 °C) 95 %      MAP       --         Physical Exam    Nursing note and vitals reviewed.  Constitutional: She appears well-developed and well-nourished. She is not diaphoretic.   HENT:   Head: Normocephalic. Mouth/Throat: Oropharynx is clear and moist.   Eyes: Conjunctivae are normal. No scleral icterus.   Neck: Neck supple. No JVD present.   Cardiovascular:  Normal rate and intact distal pulses.           Pulmonary/Chest: No respiratory distress. She has no wheezes.   Abdominal: Abdomen is soft. She exhibits no distension. There is no abdominal tenderness. There is no rebound and no guarding.   Musculoskeletal:         General: No tenderness or edema. Normal range of motion.      Cervical back: Neck supple.     Neurological: She is alert and oriented to person, place, and time. She has normal strength. No sensory deficit.   Skin: Skin is warm and dry.   Psychiatric: She has a normal mood and affect. Her behavior is normal.         ED Course   Procedures  Labs Reviewed   CBC W/ AUTO DIFFERENTIAL - Abnormal       Result Value    WBC 11.88      RBC 4.62      Hemoglobin 14.1      Hematocrit 43.2      MCV 94      MCH 30.5      MCHC 32.6      RDW 13.4      Platelets 325      MPV 11.6      Immature Granulocytes 0.4      Gran # (ANC) 8.6 (*)     Immature Grans (Abs) 0.05 (*)     Lymph # 2.2      Mono # 0.8      Eos # 0.3      Baso # 0.04      nRBC 0      Gran % 72.1      Lymph % 18.7      Mono % 6.3      Eosinophil % 2.2      Basophil % 0.3      Differential Method Automated      Narrative:     Release to patient->Immediate   COMPREHENSIVE METABOLIC PANEL - Abnormal    Sodium 135 (*)     Potassium 4.3      Chloride 101      CO2 25      Glucose 93      BUN 13      Creatinine 0.9      Calcium 10.0       Total Protein 8.0      Albumin 4.1      Total Bilirubin 0.4      Alkaline Phosphatase 95      AST 19      ALT 23      eGFR >60.0      Anion Gap 9      Narrative:     Release to patient->Immediate   D DIMER, QUANTITATIVE - Abnormal    D-Dimer 0.57 (*)    ISTAT PROCEDURE - Abnormal    POC PH 7.400      POC PCO2 46.6 (*)     POC PO2 27 (*)     POC HCO3 28.9 (*)     POC BE 4 (*)     POC SATURATED O2 51      POC TCO2 30 (*)     Sample VENOUS      Site Other      Allens Test N/A     INFLUENZA A & B BY MOLECULAR    Influenza A, Molecular Negative      Influenza B, Molecular Negative      Flu A & B Source Nasal swab     HEPATITIS C ANTIBODY    Hepatitis C Ab Non-reactive      Narrative:     Release to patient->Immediate   HIV 1 / 2 ANTIBODY    HIV 1/2 Ag/Ab Non-reactive      Narrative:     Release to patient->Immediate   TROPONIN I    Troponin I <0.006      Narrative:     Release to patient->Immediate   B-TYPE NATRIURETIC PEPTIDE    BNP <10      Narrative:     Release to patient->Immediate   SARS-COV-2 RNA AMPLIFICATION, QUAL    SARS-CoV-2 RNA, Amplification, Qual Negative     CK   LIPASE   URINALYSIS, REFLEX TO URINE CULTURE   TROPONIN I   LIPASE   CK   HEPATITIS B SURFACE ANTIGEN     Results for orders placed or performed during the hospital encounter of 11/20/24   EKG 12-lead    Collection Time: 11/20/24  7:03 AM   Result Value Ref Range    QRS Duration 80 ms    [facility] QTC Calculation 452 ms   EKG 12-lead    Collection Time: 11/20/24  7:17 AM   Result Value Ref Range    QRS Duration 80 ms    [facility] QTC Calculation 463 ms   Hepatitis C Antibody    Collection Time: 11/20/24  7:41 AM   Result Value Ref Range    Hepatitis C Ab Non-reactive Non-reactive   HIV 1/2 Ag/Ab (4th Gen)    Collection Time: 11/20/24  7:41 AM   Result Value Ref Range    HIV 1/2 Ag/Ab Non-reactive Non-reactive   CBC auto differential    Collection Time: 11/20/24  7:41 AM   Result Value Ref Range    WBC 11.88 3.90 - 12.70 K/uL    RBC 4.62 4.00 - 5.40 M/uL     Hemoglobin 14.1 12.0 - 16.0 g/dL    Hematocrit 43.2 37.0 - 48.5 %    MCV 94 82 - 98 fL    MCH 30.5 27.0 - 31.0 pg    MCHC 32.6 32.0 - 36.0 g/dL    RDW 13.4 11.5 - 14.5 %    Platelets 325 150 - 450 K/uL    MPV 11.6 9.2 - 12.9 fL    Immature Granulocytes 0.4 0.0 - 0.5 %    Gran # (ANC) 8.6 (H) 1.8 - 7.7 K/uL    Immature Grans (Abs) 0.05 (H) 0.00 - 0.04 K/uL    Lymph # 2.2 1.0 - 4.8 K/uL    Mono # 0.8 0.3 - 1.0 K/uL    Eos # 0.3 0.0 - 0.5 K/uL    Baso # 0.04 0.00 - 0.20 K/uL    nRBC 0 0 /100 WBC    Gran % 72.1 38.0 - 73.0 %    Lymph % 18.7 18.0 - 48.0 %    Mono % 6.3 4.0 - 15.0 %    Eosinophil % 2.2 0.0 - 8.0 %    Basophil % 0.3 0.0 - 1.9 %    Differential Method Automated    Comprehensive metabolic panel    Collection Time: 11/20/24  7:41 AM   Result Value Ref Range    Sodium 135 (L) 136 - 145 mmol/L    Potassium 4.3 3.5 - 5.1 mmol/L    Chloride 101 95 - 110 mmol/L    CO2 25 23 - 29 mmol/L    Glucose 93 70 - 110 mg/dL    BUN 13 6 - 20 mg/dL    Creatinine 0.9 0.5 - 1.4 mg/dL    Calcium 10.0 8.7 - 10.5 mg/dL    Total Protein 8.0 6.0 - 8.4 g/dL    Albumin 4.1 3.5 - 5.2 g/dL    Total Bilirubin 0.4 0.1 - 1.0 mg/dL    Alkaline Phosphatase 95 40 - 150 U/L    AST 19 10 - 40 U/L    ALT 23 10 - 44 U/L    eGFR >60.0 >60 mL/min/1.73 m^2    Anion Gap 9 8 - 16 mmol/L   Troponin I #1    Collection Time: 11/20/24  7:41 AM   Result Value Ref Range    Troponin I <0.006 0.000 - 0.026 ng/mL   B-Type natriuretic peptide (BNP)    Collection Time: 11/20/24  7:41 AM   Result Value Ref Range    BNP <10 0 - 99 pg/mL   D dimer, quantitative    Collection Time: 11/20/24  7:43 AM   Result Value Ref Range    D-Dimer 0.57 (H) <0.50 mg/L FEU   Influenza A & B by Molecular    Collection Time: 11/20/24  7:47 AM    Specimen: Nasopharyngeal Swab   Result Value Ref Range    Influenza A, Molecular Negative Negative    Influenza B, Molecular Negative Negative    Flu A & B Source Nasal swab    COVID-19 Rapid Screening    Collection Time: 11/20/24  7:47  AM   Result Value Ref Range    SARS-CoV-2 RNA, Amplification, Qual Negative Negative   ISTAT PROCEDURE    Collection Time: 11/20/24  8:53 AM   Result Value Ref Range    POC PH 7.400 7.35 - 7.45    POC PCO2 46.6 (H) 35 - 45 mmHg    POC PO2 27 (LL) 40 - 60 mmHg    POC HCO3 28.9 (H) 24 - 28 mmol/L    POC BE 4 (H) -2 to 2 mmol/L    POC SATURATED O2 51 95 - 100 %    POC TCO2 30 (H) 24 - 29 mmol/L    Sample VENOUS     Site Other     Allens Test N/A    POCT CARBOXYHEMOGLOBIN Once    Collection Time: 11/20/24  9:04 AM   Result Value Ref Range    Carboxyhemoglobin 3.2 1.5 - 5 %          ECG Results              EKG 12-lead (Final result)        Collection Time Result Time QRS Duration [facility] QTC Calculation    11/20/24 07:17:41 11/20/24 08:39:09 80 463                     Final result by Interface, Lab In [facility] (11/20/24 08:39:18)                   Narrative:    Test Reason : R07.9,    Vent. Rate : 114 BPM     Atrial Rate : 114 BPM     P-R Int : 124 ms          QRS Dur :  80 ms      QT Int : 336 ms       P-R-T Axes :  70  58  91 degrees    QTcB Int : 463 ms    Sinus tachycardia  Otherwise normal ECG  When compared with ECG of 20-Nov-2024 07:03,  No significant change was found  Confirmed by Garry Yao ([id number]) on 11/20/2024 8:39:08 AM    Referred By:            Confirmed By: Garry Yao                                     EKG 12-lead (Final result)        Collection Time Result Time QRS Duration [facility] QTC Calculation    11/20/24 07:03:38 11/20/24 08:44:46 80 452                     Final result by Interface, Lab In [facility] (11/20/24 08:44:52)                   Narrative:    Test Reason : R07.9,    Vent. Rate : 109 BPM     Atrial Rate : 109 BPM     P-R Int : 130 ms          QRS Dur :  80 ms      QT Int : 336 ms       P-R-T Axes :  53  38  78 degrees    QTcB Int : 452 ms    Sinus tachycardia  Abnormal R wave progression in the precordial leads - Cannot rule out  Anterior infarct ,age undetermined  Abnormal ECG  When compared with  ECG of 20-Apr-2018 14:55,  No significant change was found  Confirmed by Garry Yao ([id number]) on 11/20/2024 8:44:43 AM    Referred By:            Confirmed By: Garry Yao                                  Imaging Results              CTA Chest Non-Coronary (PE Studies) (Final result)  Result time 11/20/24 09:37:07      Final result by Deni Boyd MD (11/20/24 09:37:07)                   Impression:      1. No pulmonary embolism is identified.  2. No acute pulmonary process.      Electronically signed by: Deni Ortiz  Date:    11/20/2024  Time:    09:37               Narrative:    EXAMINATION:  CTA CHEST NON CORONARY (PE STUDIES)    CLINICAL HISTORY:  Pulmonary embolism (PE) suspected, positive D-dimer;    TECHNIQUE:  CTA of the chest was obtained. Images were reformatted and reviewed in coronal and sagittal planes. Volume-rendered 3D reconstructed images were acquired by post processing for a better visualization of the vascular anomalies, on an independent [employer] workstation with concurrent supervision and archived for permanent records.  Images were acquired during the arterial phase after 75 cc of non ionic intravenous contrast administration.    Suboptimal assessment of the abdominal solid organs due to lack of a venous phase.    COMPARISON:  None.    FINDINGS:  Diagnostic quality: Adequate.    Pulmonary arteries: The pulmonary arteries are well visualized through the segmental level. No pulmonary embolism is identified.    Right heart strain: None.    Lungs and pleura: Normal.    Mediastinum and adriana: No mediastinal or hilar adenopathy.    Vessels: Minimal atherosclerotic changes in the aorta and coronary arteries.    Heart and pericardium: Heart size is normal. No pericardial effusion.    Upper abdomen: Cholecystectomy    Bones: Mild to moderate anterior bridging osteophytes in the thoracic spine.                                       X-Ray Chest AP Portable (Final result)  Result time  "11/20/24 08:27:23      Final result by Case Hernández MD (11/20/24 08:27:23)                   Impression:      No acute cardiopulmonary finding identified on this single view.      Electronically signed by: Caes Hernández MD  Date:    11/20/2024  Time:    08:27               Narrative:    EXAMINATION:  XR CHEST AP PORTABLE    CLINICAL HISTORY:  Provided history is "  Chest pain, unspecified".    TECHNIQUE:  One view of the chest.    COMPARISON:  04/20/2018.    FINDINGS:  Cardiac wires overlie the chest.  Cardiac silhouette is not enlarged.  No focal consolidation.  No sizable pleural effusion.  No pneumothorax.                                       Medications   aspirin EC tablet 325 mg (325 mg Oral Given 11/20/24 0720)   iohexoL (OMNIPAQUE 350) injection 75 mL (75 mLs Intravenous Given 11/20/24 0915)     Medical Decision Making    Additional MDM:     EKG: I have independently interpreted EKG(s) - see notes. ER attending physician reviewed and signed - No STEMI     X-Rays: I have independently interpreted X-Ray(s) - see notes., Chest X-Ray - Independent Interpretation - Heart size normal, Lungs clear, No acute abnormality.   PERC Rule:   Age is greater than or equal to 50 = 1.0  Heart Rate is greater than or equal to 100 = 0.0  SaO2 on room air < 95% = 0.0  Unilateral leg swelling = 0.0  Hemoptysis = 0.0  Recent surgery or trauma = 0.0  Prior PE or DVT =  1.0  Hormone use = 0.00  PERC Score = 2      Heart Score:    History:          Slightly suspicious.  ECG:             Nonspecific repolarisation disturbance  Age:               45-65 years  Risk factors: >= 3 risk factors or history of atherosclerotic disease  Troponin:       Less than or equal to normal limit  Heart Score = 4                           Medical Decision Making:   History:   Old Records Summarized: records from another hospital.  Initial Assessment:   53 yo F, reports N/V/D since Sunday, developed chest pain around midnight last " night  Differential Diagnosis:   ACS, PE, covid, dehydration, dysrhythmia, electrolyte derangement, pneumonia, natural gas/propane exposure, CO poisoning, pleurisy, bronchitis, viral syndrome, etc   Clinical Tests:   Lab Tests: Ordered and Reviewed  Radiological Study: Ordered and Reviewed  Medical Tests: Ordered and Reviewed  ED Management:  Vital signs reviewed, afebrile, low BP but in normal range, no tachycardia, no tachypnea or hypoxia   Records reviewed from previous ER visits at outside facilities - pt has had several ER visits for similar chest pain complaints with unremarkable work ups, although no previous stress/echo or angiogram on record. Chart review of previous EKG from outside facility in 2023 with non-specific ST segment depression less than 1 mm in lateral leads, similar to current EKG here today   Case discussed in detail with the ER attending physician, who also examined the patient   Pt expressing concern that symptoms of CP, N/V/D may be related to breathing natural gas fumes in her laundry room while hooking up a dryer several days ago. Although she denies any coughing, wheezing, SOB/WHITE at any time. SaO2 99% on RA. Chest x ray completed, unremarkable. VBG and carboxyhgb completed and unremarkable.   Initial serum troponin negative, normal WBC count, negative BNP.    Pt specifies having 4-5 episodes of diarrhea and 4-5 episodes of vomiting every day since symptoms onset Sunday. No diarrhea or vomiting noted throughout ED stay. No recent antibiotics, hospitalization, travel, undercooked food consumption, camping, or sick contacts. Covid/Flu negative. Abdominal exam benign.    Slightly elevated ddimer - discussed with ED attending - pt moderate/high risk for PE - hx of previous, hx of cancer - will get CTA chest   CTA chest PE study completed - no evidence of PE   ED attending physician requesting admission for chest pain rule out - plan for continuation of telemetry, serial cardiac enzymes, and  stress/echo   Pt updated on results and recommendations for admission/observation for further evaluation and management. Pt verbalized understanding and comfort with plan.   I discussed the case with hospital medicine and EDOU provider, pt accepted to EDOU         Other:   I have discussed this case with another health care provider.  Medical complexity: moderate risk              Clinical Impression:  Final diagnoses:  [R07.9] Chest pain (Primary)  [R11.2, R19.7] Nausea vomiting and diarrhea  [R25.2] Muscle cramps  [Z77.29] Exposure to natural gas          ED Disposition Condition    Observation Stable                Mikie Pichardo, PASAVAGE  11/20/24 1036

## 2024-11-20 NOTE — ED NOTES
LOC: The patient is awake, alert and aware of environment with an appropriate affect, the patient is oriented x 3 and speaking appropriately.   APPEARANCE: Patient appears comfortable and in no acute distress, patient is clean and well groomed.  SKIN: The skin is warm and dry, color consistent with ethnicity.   MUSCULOSKELETAL: Patient moving all extremities spontaneously, no swelling noted.  RESPIRATORY: Airway is open and patent, respirations are spontaneous, patient has a normal effort and rate, no accessory muscle use noted.  CARDIAC: Patient has a normal rate and regular rhythm, no edema noted, capillary refill < 3 seconds. Pt reports continued 8/10 CP.  GASTRO: Soft and non tender to palpation, no distention noted.   : Pt denies any pain or frequency with urination.  NEURO: Pt opens eyes spontaneously, behavior appropriate to situation, follows commands.

## 2024-11-20 NOTE — Clinical Note
Diagnosis: Chest pain [979839]   Future Attending Provider: COMPA ARTIS [[id number]]   Is the patient being sent to ED Observation?: Yes

## 2024-11-20 NOTE — PROGRESS NOTES
Procedure explained. Optison given ivp via  left forearm sl for imaging pre and post ex2d. Tolerated well. Sl flushed before and after with 10 cc ns.

## 2024-11-20 NOTE — H&P
"ED Observation Unit  History and Physical      I assumed care of this patient from the Main ED at onset of observation time, 1006 on 11/20/2024.       History of Present Illness:    The patient is a 54 year old female. She has a documented past medical history of HTN/HLD (stopped meds after weight loss through diet), obesity, PE after childbirth in her 20's not currently on AC, asthma, cervical cancer s/p hysterectomy, renal cell carcinoma s/p right partial nephrectomy, colitis, kidney stones, insomnia, neuropathy, and tobacco use (quit 3 weeks ago).      She presents to the ER for an emergent evaluation with a chief complaint of chest pain. She describes the pain as constant in course, moderate in degree.  She describes it as heaviness and pressure like something is sitting on her chest.  She states that the pain seems to radiate to her back and down her left arm. She states that she has had this chest pain in addition to nausea, vomiting, and diarrhea for 3 days, symptom onset on Sunday. She states that she recently moved to [place] from [place] with her , and that on Sunday she was hooking up a gas clothes dryer in her new place and began smelling gas fumes. She states that she became very nauseated and shortly thereafter began having the N/V/D and chest pain. She states that she has been having symptoms daily ever since. She states that yesterday she began having her fingers and toes "cramp up" and she was concerned that she was dehydrated. She states that she thought she was better yesterday evening and went out for tacos, but vomited right after eating. She states that around midnight last night, the chest pain became worse, prompting her to drive herself to the ER this morning. She denies ever experiencing any coughing, wheezes, SOB, at any point. She states that she has had multiple ER visits for chest pain while living in [place] in the past, but states that she has never had a stress test in the " past.  Of note, she reports that her mother had a triple bypass at age 50.  She states that her father had a heart attack in his 70s.  Patient reports an extensive smoking history about 16.5 pack-year history. Quit 3 weeks ago.    Pain currently rated 9/10.     I reviewed the ED Provider Note dated 2024 prior to my evaluation of this patient.  I reviewed all labs and imaging performed in the Main ED, prior to patient being placed in Observation. Patient was placed in the ED Observation Unit for Chest pain.    PMHx   Past Medical History:   Diagnosis Date    ADHD (attention deficit hyperactivity disorder)     Asthma     Cervical cancer     Colitis determined by colorectal biopsy     History of pulmonary embolus (PE)     Hyperlipidemia     Hypertension     Insomnia     Kidney stone     Neuropathy     Obesity     Renal cell carcinoma     Vitamin D deficiency       Past Surgical History:   Procedure Laterality Date     SECTION      CHOLECYSTECTOMY      HERNIA REPAIR      HYSTERECTOMY      PARTIAL NEPHRECTOMY Right         Family Hx   Family History   Problem Relation Name Age of Onset    Hyperlipidemia Mother      Hypertension Mother      Diabetes Mother      Heart disease Mother      Heart attack Mother      Arthritis Father      Heart disease Father      Diabetes Father          Social Hx   Social History     Socioeconomic History    Marital status:    Occupational History    Occupation: Mental health technician/Radiology technician   Tobacco Use    Smoking status: Every Day     Current packs/day: 0.50     Types: Cigarettes    Smokeless tobacco: Never   Substance and Sexual Activity    Alcohol use: No    Drug use: No    Sexual activity: Yes     Partners: Male     Social Drivers of Health      Received from [employer]    Food Insecurity    Received from [employer]    Housing Stability        Vital Signs   Vitals:    24 0904 24 0943 24 1014  11/20/24 1019   BP: 128/60  (!) 100/59    BP Location:       Pulse: 91  86    Resp:   18 18   Temp:  98.9 °F (37.2 °C)     TempSrc:  Oral     SpO2: 100%  99%    Weight:       Height:            Review of Systems  Review of Systems   Cardiovascular:  Positive for chest pain.   Gastrointestinal:  Positive for nausea.       Physical Exam  Physical Exam  Vitals reviewed.   Constitutional:       General: She is not in acute distress.     Appearance: She is not diaphoretic.   HENT:      Head: Normocephalic and atraumatic.   Cardiovascular:      Rate and Rhythm: Normal rate and regular rhythm.      Heart sounds: Heart sounds not distant. No murmur heard.     No friction rub. No gallop.   Pulmonary:      Effort: Pulmonary effort is normal. No respiratory distress.      Breath sounds: Normal breath sounds. No decreased breath sounds, wheezing, rhonchi or rales.   Abdominal:      Palpations: Abdomen is soft. There is no mass.      Tenderness: There is no abdominal tenderness. There is no guarding.   Musculoskeletal:      Cervical back: Neck supple.      Right lower leg: No edema.      Left lower leg: No edema.   Skin:     General: Skin is warm and dry.   Neurological:      Mental Status: She is alert.   Psychiatric:         Mood and Affect: Mood and affect normal.         Medications:   Scheduled Meds:   amitriptyline  50 mg Oral QHS    gabapentin  300 mg Oral Q12H    lactated ringers  1,000 mL Intravenous ED 1 Time    ondansetron  4 mg Intravenous ED 1 Time    QUEtiapine  50 mg Oral QHS    spironolactone  100 mg Oral QHS     Continuous Infusions:  PRN Meds:.  Current Facility-Administered Medications:     melatonin, 6 mg, Oral, Nightly PRN    ondansetron, 4 mg, Intravenous, Q8H PRN    sodium chloride 0.9%, 10 mL, Intravenous, PRN      Assessment/Plan:  Chest pain  Nausea, vomiting  - Trop trend: <0.006 > 0.012. Continue to trend  - dimer was slightly elevated at 0.57.  CT PE study negative for VTE or other acute process.  -  chest x-ray without acute process.  - lipase within normal limits  - CPK within normal limits  - COVID, flu tests negative  - no significant metabolic derangements  - exercise stress echo today  - monitor on telemetry  - BP is soft. Too low to administer nitroglycerin at this time.  - will provide IV fluids as patient has not been able to tolerate large amount of oral intake and I do not feel that she will be able to replenish fluids substantially enough orally  - p.r.n. antiemetics    Case was discussed with the ED provider, DULCE Pichardo.

## 2024-11-20 NOTE — PROGRESS NOTES
"ED Observation Unit  Progress Note      HPI   The patient is a 54 year old female. She has a documented past medical history of HTN/HLD (stopped meds after weight loss through diet), obesity, PE after childbirth in her 20's not currently on AC, asthma, cervical cancer s/p hysterectomy, renal cell carcinoma s/p right partial nephrectomy, colitis, kidney stones, insomnia, neuropathy, and tobacco use (quit 3 weeks ago).      She presents to the ER for an emergent evaluation with a chief complaint of chest pain. She describes the pain as constant in course, moderate in degree.  She describes it as heaviness and pressure like something is sitting on her chest.  She states that the pain seems to radiate to her back and down her left arm. She states that she has had this chest pain in addition to nausea, vomiting, and diarrhea for 3 days, symptom onset on Sunday. She states that she recently moved to [place] from [place] with her , and that on Sunday she was hooking up a gas clothes dryer in her new place and began smelling gas fumes. She states that she became very nauseated and shortly thereafter began having the N/V/D and chest pain. She states that she has been having symptoms daily ever since. She states that yesterday she began having her fingers and toes "cramp up" and she was concerned that she was dehydrated. She states that she thought she was better yesterday evening and went out for tacos, but vomited right after eating. She states that around midnight last night, the chest pain became worse, prompting her to drive herself to the ER this morning. She denies ever experiencing any coughing, wheezes, SOB, at any point. She states that she has had multiple ER visits for chest pain while living in [place] in the past, but states that she has never had a stress test in the past.  Of note, she reports that her mother had a triple bypass at age 50.  She states that her father had a heart attack in his 70s.  " Patient reports an extensive smoking history about 16.5 pack-year history. Quit 3 weeks ago.    Pain currently rated 9/10.     Interval History    Patient returns from stress test.  She continues to complain of 9/10 chest pressure.  Her pain was unchanged with nitroglycerin.  Will try additional analgesia.  She states that she does not feel comfortable going home until her pain has resolved.     PMHx   Past Medical History:   Diagnosis Date    ADHD (attention deficit hyperactivity disorder)     Asthma     Cervical cancer     Colitis determined by colorectal biopsy     History of pulmonary embolus (PE)     Hyperlipidemia     Hypertension     Insomnia     Kidney stone     Neuropathy     Obesity     Renal cell carcinoma     Vitamin D deficiency       Past Surgical History:   Procedure Laterality Date     SECTION      CHOLECYSTECTOMY      HERNIA REPAIR      HYSTERECTOMY      PARTIAL NEPHRECTOMY Right         Family Hx   Family History   Problem Relation Name Age of Onset    Hyperlipidemia Mother      Hypertension Mother      Diabetes Mother      Heart disease Mother      Heart attack Mother      Arthritis Father      Heart disease Father      Diabetes Father          Social Hx   Social History     Socioeconomic History    Marital status:    Occupational History    Occupation: Mental health technician/Radiology technician   Tobacco Use    Smoking status: Every Day     Current packs/day: 0.50     Types: Cigarettes    Smokeless tobacco: Never   Substance and Sexual Activity    Alcohol use: No    Drug use: No    Sexual activity: Yes     Partners: Male     Social Drivers of Health      Received from [employer]    Food Insecurity   Transportation Needs: No Transportation Needs (2024)    TRANSPORTATION NEEDS     Transportation : No   Physical Activity: Unknown (2024)    Exercise Vital Sign     Days of Exercise per Week: 7 days    Received from [employer]    Housing  "Stability        Vital Signs   Vitals:    11/20/24 1408 11/20/24 1554 11/20/24 1710 11/20/24 1845   BP: (!) 112/56  (!) 120/57 (!) 111/58   BP Location:    Right arm   Pulse: 91  83 81   Resp: 20 16 18   Temp:   98.5 °F (36.9 °C) 98.2 °F (36.8 °C)   TempSrc:   Oral Oral   SpO2: 96%  97% 95%   Weight:  81.2 kg (179 lb)     Height:  5' 1" (1.549 m)          Review of Systems  Review of Systems   Cardiovascular:  Positive for chest pain.       Brief Physical Exam/Reassessment   Physical Exam  Vitals and nursing note reviewed.   Constitutional:       General: She is not in acute distress.     Appearance: She is well-developed. She is obese. She is not ill-appearing, toxic-appearing or diaphoretic.   HENT:      Head: Normocephalic and atraumatic.      Right Ear: External ear normal.      Left Ear: External ear normal.      Mouth/Throat:      Mouth: Mucous membranes are moist.      Pharynx: Oropharynx is clear.   Eyes:      Extraocular Movements: Extraocular movements intact.      Pupils: Pupils are equal, round, and reactive to light.   Cardiovascular:      Rate and Rhythm: Normal rate and regular rhythm.   Pulmonary:      Effort: Pulmonary effort is normal.      Breath sounds: Normal breath sounds.   Abdominal:      General: Bowel sounds are normal.      Palpations: Abdomen is soft.      Tenderness: There is no abdominal tenderness.   Musculoskeletal:         General: Normal range of motion.      Cervical back: Normal range of motion and neck supple.      Right lower leg: No edema.      Left lower leg: No edema.   Skin:     General: Skin is warm and dry.      Capillary Refill: Capillary refill takes less than 2 seconds.   Neurological:      General: No focal deficit present.      Mental Status: She is alert and oriented to person, place, and time.   Psychiatric:         Mood and Affect: Mood normal. Mood is not anxious.         Behavior: Behavior normal. Behavior is not agitated.         Thought Content: Thought " content normal.         Judgment: Judgment normal.         Labs/Imaging   Labs Reviewed   CBC W/ AUTO DIFFERENTIAL - Abnormal       Result Value    WBC 11.88      RBC 4.62      Hemoglobin 14.1      Hematocrit 43.2      MCV 94      MCH 30.5      MCHC 32.6      RDW 13.4      Platelets 325      MPV 11.6      Immature Granulocytes 0.4      Gran # (ANC) 8.6 (*)     Immature Grans (Abs) 0.05 (*)     Lymph # 2.2      Mono # 0.8      Eos # 0.3      Baso # 0.04      nRBC 0      Gran % 72.1      Lymph % 18.7      Mono % 6.3      Eosinophil % 2.2      Basophil % 0.3      Differential Method Automated      Narrative:     Release to patient->Immediate   COMPREHENSIVE METABOLIC PANEL - Abnormal    Sodium 135 (*)     Potassium 4.3      Chloride 101      CO2 25      Glucose 93      BUN 13      Creatinine 0.9      Calcium 10.0      Total Protein 8.0      Albumin 4.1      Total Bilirubin 0.4      Alkaline Phosphatase 95      AST 19      ALT 23      eGFR >60.0      Anion Gap 9      Narrative:     Release to patient->Immediate   D DIMER, QUANTITATIVE - Abnormal    D-Dimer 0.57 (*)    URINALYSIS, REFLEX TO URINE CULTURE - Abnormal    Specimen UA Urine, Clean Catch      Color, UA Yellow      Appearance, UA Hazy (*)     pH, UA 6.0      Specific Gravity, UA 1.010      Protein, UA 1+ (*)     Glucose, UA Negative      Ketones, UA 1+ (*)     Bilirubin (UA) Negative      Occult Blood UA Trace (*)     Nitrite, UA Negative      Leukocytes, UA Negative      Narrative:     Specimen Source->Urine   URINALYSIS MICROSCOPIC - Abnormal    RBC, UA 4      WBC, UA 2      Bacteria Many (*)     Squam Epithel, UA 11      Hyaline Casts, UA 3 (*)     Microscopic Comment SEE COMMENT      Narrative:     Specimen Source->Urine   ISTAT PROCEDURE - Abnormal    POC PH 7.400      POC PCO2 46.6 (*)     POC PO2 27 (*)     POC HCO3 28.9 (*)     POC BE 4 (*)     POC SATURATED O2 51      POC TCO2 30 (*)     Sample VENOUS      Site Other      Allens Test N/A     INFLUENZA  A & B BY MOLECULAR    Influenza A, Molecular Negative      Influenza B, Molecular Negative      Flu A & B Source Nasal swab     HEPATITIS C ANTIBODY    Hepatitis C Ab Non-reactive      Narrative:     Release to patient->Immediate   HIV 1 / 2 ANTIBODY    HIV 1/2 Ag/Ab Non-reactive      Narrative:     Release to patient->Immediate   TROPONIN I    Troponin I <0.006      Narrative:     Release to patient->Immediate   B-TYPE NATRIURETIC PEPTIDE    BNP <10      Narrative:     Release to patient->Immediate   SARS-COV-2 RNA AMPLIFICATION, QUAL    SARS-CoV-2 RNA, Amplification, Qual Negative     TROPONIN I    Troponin I 0.012     CK   LIPASE   LIPASE    Lipase 11      Narrative:     Release to patient->Immediate    add on [id number] [id number] per DULCE Parra-C   11/20/2024  10:18 T.Ryley   CK    CPK 56      Narrative:     Release to patient->Immediate    add on [id number] Lipase-[id number] per Martine Connolly PA-C   11/20/2024  10:18 T.Ryley   TROPONIN I    Troponin I <0.006        Imaging Results              CTA Chest Non-Coronary (PE Studies) (Final result)  Result time 11/20/24 09:37:07      Final result by Deni Boyd MD (11/20/24 09:37:07)                   Impression:      1. No pulmonary embolism is identified.  2. No acute pulmonary process.      Electronically signed by: Deni Ortiz  Date:    11/20/2024  Time:    09:37               Narrative:    EXAMINATION:  CTA CHEST NON CORONARY (PE STUDIES)    CLINICAL HISTORY:  Pulmonary embolism (PE) suspected, positive D-dimer;    TECHNIQUE:  CTA of the chest was obtained. Images were reformatted and reviewed in coronal and sagittal planes. Volume-rendered 3D reconstructed images were acquired by post processing for a better visualization of the vascular anomalies, on an independent [employer] workstation with concurrent supervision and archived for permanent records.  Images were acquired during the arterial phase after 75 cc of  "non ionic intravenous contrast administration.    Suboptimal assessment of the abdominal solid organs due to lack of a venous phase.    COMPARISON:  None.    FINDINGS:  Diagnostic quality: Adequate.    Pulmonary arteries: The pulmonary arteries are well visualized through the segmental level. No pulmonary embolism is identified.    Right heart strain: None.    Lungs and pleura: Normal.    Mediastinum and adriana: No mediastinal or hilar adenopathy.    Vessels: Minimal atherosclerotic changes in the aorta and coronary arteries.    Heart and pericardium: Heart size is normal. No pericardial effusion.    Upper abdomen: Cholecystectomy    Bones: Mild to moderate anterior bridging osteophytes in the thoracic spine.                                       X-Ray Chest AP Portable (Final result)  Result time 11/20/24 08:27:23      Final result by Case Hernández MD (11/20/24 08:27:23)                   Impression:      No acute cardiopulmonary finding identified on this single view.      Electronically signed by: Case Hernández MD  Date:    11/20/2024  Time:    08:27               Narrative:    EXAMINATION:  XR CHEST AP PORTABLE    CLINICAL HISTORY:  Provided history is "  Chest pain, unspecified".    TECHNIQUE:  One view of the chest.    COMPARISON:  04/20/2018.    FINDINGS:  Cardiac wires overlie the chest.  Cardiac silhouette is not enlarged.  No focal consolidation.  No sizable pleural effusion.  No pneumothorax.                                       I reviewed all labs, imaging, EKGs.     Plan   Chest pain  Nausea, vomiting  - Trop trend: <0.006, 0.012, <0.006   - dimer was slightly elevated at 0.57.  CT PE study negative for VTE or other acute process.  - chest x-ray without acute process.  - lipase within normal limits  - CPK within normal limits  - COVID, flu tests negative  - no significant metabolic derangements  - stress test performed today, without evidence of ischemia   - monitor on telemetry  - patient " received nitroglycerin without any change in her chest pain   - multimodal pain control; patient with continuing and constant chest pressure. She does not feel comfortable with discharge home until her pain has resolved. Will continue to monitor.     I have discussed this case with BETO Connolly.

## 2024-11-20 NOTE — PLAN OF CARE
[facility] - Emergency Dept  Initial Discharge Assessment       Primary Care Provider: Mario Saldana MD (Inactive)    Admission Diagnosis: Chest pain [R07.9]    Admission Date: 11/20/2024  Expected Discharge Date:     Pt is independent with their ADL's and doesn't require assistance.     Post acute was discussed, Pt declined.     Pt discharge home with no needs.    Transition of Care Barriers: (P) None    Payor: [place] [employer] / Plan: [facility] EXCHANGE PLAN / Product Type: Commercial /     Extended Emergency Contact Information  Primary Emergency Contact: Keith Kirk   [place]  Mobile Phone: [phone]  Relation: Spouse    Discharge Plan A: (P) Home  Discharge Plan B: (P) Home      [id number] - [place], [place] - [id number] [facility]  [place] [address]  Phone: [phone] Fax: [phone]      Initial Assessment (most recent)       Adult Discharge Assessment - 11/20/24 1315          Discharge Assessment    Assessment Type Discharge Planning Assessment (P)      Confirmed/corrected address, phone number and insurance Yes (P)      Confirmed Demographics Correct on Facesheet (P)      Source of Information patient (P)      Does patient/caregiver understand observation status Yes (P)      Reason For Admission Chest pain (P)      People in Home spouse (P)      Do you expect to return to your current living situation? Yes (P)      Do you have help at home or someone to help you manage your care at home? No (P)      Prior to hospitilization cognitive status: Alert/Oriented;No Deficits (P)      Current cognitive status: Alert/Oriented;No Deficits (P)      Walking or Climbing Stairs Difficulty no (P)      Dressing/Bathing Difficulty no (P)      Home Layout Able to live on 1st floor (P)      Equipment Currently Used at Home none (P)      Readmission within 30 days? No (P)      Patient currently being followed by outpatient case management? No (P)      Do you currently have  service(s) that help you manage your care at home? No (P)      Do you have any problems affording any of your prescribed medications? No (P)      Is the patient taking medications as prescribed? yes (P)      Who is going to help you get home at discharge? Family/friends (P)      How do you get to doctors appointments? car, drives self (P)      Are you on dialysis? No (P)      Do you take coumadin? No (P)      Discharge Plan A Home (P)      Discharge Plan B Home (P)      DME Needed Upon Discharge  none (P)      Discharge Plan discussed with: Patient (P)      Transition of Care Barriers None (P)         Physical Activity    On average, how many days per week do you engage in moderate to strenuous exercise (like a brisk walk)? 7 days (P)         Transportation Needs    Has the lack of transportation kept you from medical appointments, meetings, work or from getting things needed for daily living? No (P)         Alcohol Use    Q1: How often do you have a drink containing alcohol? Never (P)      Q2: How many drinks containing alcohol do you have on a typical day when you are drinking? Patient does not drink (P)      Q3: How often do you have six or more drinks on one occasion? Never (P)                    JOSE Carver, MSW   Case Management  [facility]  Email: [email or web address]

## 2024-11-21 ENCOUNTER — ANESTHESIA EVENT (OUTPATIENT)
Dept: ENDOSCOPY | Facility: HOSPITAL | Age: 54
End: 2024-11-21
Payer: COMMERCIAL

## 2024-11-21 PROBLEM — R11.2 INTRACTABLE NAUSEA AND VOMITING: Status: ACTIVE | Noted: 2024-11-21

## 2024-11-21 PROBLEM — R19.7 DIARRHEA: Status: ACTIVE | Noted: 2024-11-21

## 2024-11-21 LAB
BACTERIA #/AREA URNS AUTO: ABNORMAL /HPF
BILIRUB UR QL STRIP: NEGATIVE
CLARITY UR REFRACT.AUTO: ABNORMAL
COLOR UR AUTO: YELLOW
GLUCOSE UR QL STRIP: NEGATIVE
HGB UR QL STRIP: NEGATIVE
HYALINE CASTS UR QL AUTO: 0 /LPF
KETONES UR QL STRIP: ABNORMAL
LACTATE SERPL-SCNC: 0.9 MMOL/L (ref 0.5–2.2)
LEUKOCYTE ESTERASE UR QL STRIP: ABNORMAL
MICROSCOPIC COMMENT: ABNORMAL
NITRITE UR QL STRIP: NEGATIVE
PH UR STRIP: 6 [PH] (ref 5–8)
PROT UR QL STRIP: ABNORMAL
RBC #/AREA URNS AUTO: 6 /HPF (ref 0–4)
SP GR UR STRIP: >1.03 (ref 1–1.03)
SQUAMOUS #/AREA URNS AUTO: 11 /HPF
URN SPEC COLLECT METH UR: ABNORMAL
WBC #/AREA URNS AUTO: 7 /HPF (ref 0–5)

## 2024-11-21 PROCEDURE — 25000003 PHARM REV CODE 250: Performed by: PHYSICIAN ASSISTANT

## 2024-11-21 PROCEDURE — 81001 URINALYSIS AUTO W/SCOPE: CPT | Performed by: PHYSICIAN ASSISTANT

## 2024-11-21 PROCEDURE — 83605 ASSAY OF LACTIC ACID: CPT | Performed by: PHYSICIAN ASSISTANT

## 2024-11-21 PROCEDURE — 96376 TX/PRO/DX INJ SAME DRUG ADON: CPT

## 2024-11-21 PROCEDURE — 63600175 PHARM REV CODE 636 W HCPCS: Performed by: PHYSICIAN ASSISTANT

## 2024-11-21 PROCEDURE — S4991 NICOTINE PATCH NONLEGEND: HCPCS | Performed by: PHYSICIAN ASSISTANT

## 2024-11-21 PROCEDURE — G0378 HOSPITAL OBSERVATION PER HR: HCPCS

## 2024-11-21 PROCEDURE — 99214 OFFICE O/P EST MOD 30 MIN: CPT | Mod: ,,, | Performed by: STUDENT IN AN ORGANIZED HEALTH CARE EDUCATION/TRAINING PROGRAM

## 2024-11-21 PROCEDURE — 96372 THER/PROPH/DIAG INJ SC/IM: CPT | Performed by: PHYSICIAN ASSISTANT

## 2024-11-21 RX ORDER — IBUPROFEN 200 MG
1 TABLET ORAL DAILY
Status: DISCONTINUED | OUTPATIENT
Start: 2024-11-21 | End: 2024-11-22 | Stop reason: HOSPADM

## 2024-11-21 RX ORDER — SODIUM CHLORIDE 0.9 % (FLUSH) 0.9 %
10 SYRINGE (ML) INJECTION EVERY 8 HOURS PRN
Status: DISCONTINUED | OUTPATIENT
Start: 2024-11-21 | End: 2024-11-22 | Stop reason: HOSPADM

## 2024-11-21 RX ORDER — POLYETHYLENE GLYCOL 3350 17 G/17G
17 POWDER, FOR SOLUTION ORAL DAILY PRN
Status: DISCONTINUED | OUTPATIENT
Start: 2024-11-21 | End: 2024-11-22 | Stop reason: HOSPADM

## 2024-11-21 RX ORDER — ENOXAPARIN SODIUM 100 MG/ML
40 INJECTION SUBCUTANEOUS EVERY 24 HOURS
Status: DISCONTINUED | OUTPATIENT
Start: 2024-11-21 | End: 2024-11-21

## 2024-11-21 RX ORDER — DOCUSATE SODIUM 100 MG
400 CAPSULE ORAL
Status: DISCONTINUED | OUTPATIENT
Start: 2024-11-21 | End: 2024-11-22 | Stop reason: HOSPADM

## 2024-11-21 RX ORDER — IBUPROFEN 200 MG
24 TABLET ORAL
Status: DISCONTINUED | OUTPATIENT
Start: 2024-11-21 | End: 2024-11-22 | Stop reason: HOSPADM

## 2024-11-21 RX ORDER — NALOXONE HCL 0.4 MG/ML
0.02 VIAL (ML) INJECTION
Status: DISCONTINUED | OUTPATIENT
Start: 2024-11-21 | End: 2024-11-22 | Stop reason: HOSPADM

## 2024-11-21 RX ORDER — ACETAMINOPHEN 325 MG/1
650 TABLET ORAL EVERY 4 HOURS PRN
Status: DISCONTINUED | OUTPATIENT
Start: 2024-11-21 | End: 2024-11-22 | Stop reason: HOSPADM

## 2024-11-21 RX ORDER — BISACODYL 10 MG/1
10 SUPPOSITORY RECTAL DAILY PRN
Status: DISCONTINUED | OUTPATIENT
Start: 2024-11-21 | End: 2024-11-22 | Stop reason: HOSPADM

## 2024-11-21 RX ORDER — IPRATROPIUM BROMIDE AND ALBUTEROL SULFATE 2.5; .5 MG/3ML; MG/3ML
3 SOLUTION RESPIRATORY (INHALATION) EVERY 4 HOURS PRN
Status: DISCONTINUED | OUTPATIENT
Start: 2024-11-21 | End: 2024-11-22 | Stop reason: HOSPADM

## 2024-11-21 RX ORDER — DICYCLOMINE HYDROCHLORIDE 10 MG/ML
20 INJECTION INTRAMUSCULAR 4 TIMES DAILY
Status: DISCONTINUED | OUTPATIENT
Start: 2024-11-21 | End: 2024-11-22

## 2024-11-21 RX ORDER — TALC
6 POWDER (GRAM) TOPICAL NIGHTLY PRN
Status: DISCONTINUED | OUTPATIENT
Start: 2024-11-21 | End: 2024-11-21

## 2024-11-21 RX ORDER — GLUCAGON 1 MG
1 KIT INJECTION
Status: DISCONTINUED | OUTPATIENT
Start: 2024-11-21 | End: 2024-11-22 | Stop reason: HOSPADM

## 2024-11-21 RX ORDER — IBUPROFEN 200 MG
16 TABLET ORAL
Status: DISCONTINUED | OUTPATIENT
Start: 2024-11-21 | End: 2024-11-22 | Stop reason: HOSPADM

## 2024-11-21 RX ADMIN — ONDANSETRON 4 MG: 2 INJECTION INTRAMUSCULAR; INTRAVENOUS at 09:11

## 2024-11-21 RX ADMIN — SUCRALFATE 1 G: 1 SUSPENSION ORAL at 12:11

## 2024-11-21 RX ADMIN — AMITRIPTYLINE HYDROCHLORIDE 50 MG: 50 TABLET ORAL at 09:11

## 2024-11-21 RX ADMIN — Medication 400 ML: at 05:11

## 2024-11-21 RX ADMIN — METHOCARBAMOL 500 MG: 500 TABLET ORAL at 05:11

## 2024-11-21 RX ADMIN — MORPHINE SULFATE 4 MG: 4 INJECTION INTRAVENOUS at 05:11

## 2024-11-21 RX ADMIN — SUCRALFATE 1 G: 1 SUSPENSION ORAL at 05:11

## 2024-11-21 RX ADMIN — GABAPENTIN 300 MG: 300 CAPSULE ORAL at 09:11

## 2024-11-21 RX ADMIN — QUETIAPINE FUMARATE 50 MG: 25 TABLET ORAL at 09:11

## 2024-11-21 RX ADMIN — GABAPENTIN 300 MG: 300 CAPSULE ORAL at 08:11

## 2024-11-21 RX ADMIN — Medication 400 ML: at 09:11

## 2024-11-21 NOTE — ED NOTES
Telemetry Verification   Patient placed on Telemetry Box  Verified with War Room  Box # [id number]   Rate 74   Rhythm Normal sinus

## 2024-11-21 NOTE — ED NOTES
Bed: EDOU04  Expected date: 11/20/24  Expected time: 6:20 PM  Means of arrival:   Comments:  Admit pulido 3

## 2024-11-21 NOTE — HPI
Maricruz Nath is a 54 year old female with a past medical history of HTN/HLD (stopped meds after weight loss through diet), obesity, PE after childbirth in her 20's not currently on AC, asthma, cervical cancer s/p hysterectomy, renal cell carcinoma s/p right partial nephrectomy, colitis, kidney stones, neuropathy, and tobacco use (quit 3 weeks ago).      She presented to the ER for an emergent evaluation with a chief complaint of chest pain. She describes the pain as a constant heaviness and pressure, like something is sitting on her chest that began Sunday but acutely worsened Wednesday, waking her from sleep and prompting her presentation. She states that the pain seems to radiate around her left flank into her back and down her left arm, with a numbness/tingling in her left arm. She also has been experiencing associated nausea, vomiting, and diarrhea since symptom onset on Sunday.     She states that she recently moved to [place] from [place] with her , and that on Sunday she was hooking up a gas clothes dryer in her new place and began smelling gas fumes. She states that she became very nauseated and shortly thereafter began having the N/V/D and chest pain. The symptoms persisted despite turning off the gas supply. She notes continuous non-bloody, watery diarrhea from Sunday to Wednesday, but has not had any diarrhea since arrival in the ED. She also notes that she has been vomiting everything she eats, but her only difficulty with swallowing is due to dry mouth. She describes her emesis as bilious, and green in color. She denies any abdominal pain other than soreness from vomiting/dry heaving. She denies any sick contacts or family with similar symptoms. Patient is not on any immunosuppressant medications.     Patient had a stress echo performed which was negative for any stress induced cardiac ischemia. CTA chest in the ED without any evidence of PE. Of note, she reports that her mother had a triple  bypass at age 50.  She states that her father had a heart attack in his 70s.  Patient reports an extensive smoking history about 16.5 pack-year history. Quit 3 weeks ago.

## 2024-11-21 NOTE — PROGRESS NOTES
"ED Observation Unit  Progress Note      HPI   The patient is a 54 year old female. She has a documented past medical history of HTN/HLD (stopped meds after weight loss through diet), obesity, PE after childbirth in her 20's not currently on AC, asthma, cervical cancer s/p hysterectomy, renal cell carcinoma s/p right partial nephrectomy, colitis, kidney stones, insomnia, neuropathy, and tobacco use (quit 3 weeks ago).      She presents to the ER for an emergent evaluation with a chief complaint of chest pain. She describes the pain as constant in course, moderate in degree.  She describes it as heaviness and pressure like something is sitting on her chest.  She states that the pain seems to radiate to her back and down her left arm. She states that she has had this chest pain in addition to nausea, vomiting, and diarrhea for 3 days, symptom onset on Sunday. She states that she recently moved to [place] from [place] with her , and that on Sunday she was hooking up a gas clothes dryer in her new place and began smelling gas fumes. She states that she became very nauseated and shortly thereafter began having the N/V/D and chest pain. She states that she has been having symptoms daily ever since. She states that yesterday she began having her fingers and toes "cramp up" and she was concerned that she was dehydrated. She states that she thought she was better yesterday evening and went out for tacos, but vomited right after eating. She states that around midnight last night, the chest pain became worse, prompting her to drive herself to the ER this morning. She denies ever experiencing any coughing, wheezes, SOB, at any point. She states that she has had multiple ER visits for chest pain while living in [place] in the past, but states that she has never had a stress test in the past.  Of note, she reports that her mother had a triple bypass at age 50.  She states that her father had a heart attack in his 70s.  " Patient reports an extensive smoking history about 16.5 pack-year history. Quit 3 weeks ago.    Pain currently rated 9/10.     Interval History   Pt reports improved but persistent chest pressure after receiving morphine. Reports no relief with GI cocktail yesterday.  Also reports some nausea but was able to tolerate some crackers and sips of water a few hours ago. BP low normal and stable. Vitals otherwise wnl. Afebrile.     PMHx   Past Medical History:   Diagnosis Date    ADHD (attention deficit hyperactivity disorder)     Asthma     Cervical cancer     Colitis determined by colorectal biopsy     History of pulmonary embolus (PE)     Hyperlipidemia     Hypertension     Insomnia     Kidney stone     Neuropathy     Obesity     Renal cell carcinoma     Vitamin D deficiency       Past Surgical History:   Procedure Laterality Date     SECTION      CHOLECYSTECTOMY      HERNIA REPAIR      HYSTERECTOMY      PARTIAL NEPHRECTOMY Right         Family Hx   Family History   Problem Relation Name Age of Onset    Hyperlipidemia Mother      Hypertension Mother      Diabetes Mother      Heart disease Mother      Heart attack Mother      Arthritis Father      Heart disease Father      Diabetes Father          Social Hx   Social History     Socioeconomic History    Marital status:    Occupational History    Occupation: Mental health technician/Radiology technician   Tobacco Use    Smoking status: Every Day     Current packs/day: 0.50     Types: Cigarettes    Smokeless tobacco: Never   Substance and Sexual Activity    Alcohol use: No    Drug use: No    Sexual activity: Yes     Partners: Male     Social Drivers of Health      Received from [employer] [facility]    Food Insecurity   Transportation Needs: No Transportation Needs (2024)    TRANSPORTATION NEEDS     Transportation : No   Physical Activity: Unknown (2024)    Exercise Vital Sign     Days of Exercise per Week: 7 days    Received from [facility]    Housing Stability        Vital Signs   Vitals:    11/21/24 0022 11/21/24 0429 11/21/24 0501 11/21/24 0725   BP: 92/60 98/65  94/60   BP Location:       Pulse: 77 77  81   Resp:   18 18   Temp: 98 °F (36.7 °C) 97.5 °F (36.4 °C)  97.7 °F (36.5 °C)   TempSrc: Oral Oral  Oral   SpO2: 95% 97%  95%   Weight:       Height:            Review of Systems  Review of Systems   Cardiovascular:  Positive for chest pain.   Gastrointestinal:  Positive for nausea.       Brief Physical Exam/Reassessment   Physical Exam  Vitals reviewed.   Constitutional:       General: She is not in acute distress.     Appearance: She is not diaphoretic.   HENT:      Head: Normocephalic and atraumatic.   Cardiovascular:      Rate and Rhythm: Normal rate and regular rhythm.      Heart sounds: Heart sounds not distant. No murmur heard.     No friction rub. No gallop.   Pulmonary:      Effort: Pulmonary effort is normal. No respiratory distress.      Breath sounds: Normal breath sounds. No decreased breath sounds, wheezing, rhonchi or rales.   Abdominal:      General: There is no distension.      Palpations: Abdomen is soft.      Tenderness: There is no abdominal tenderness. There is no guarding.   Musculoskeletal:      Cervical back: Neck supple.   Skin:     General: Skin is warm and dry.   Neurological:      Mental Status: She is alert.   Psychiatric:         Mood and Affect: Mood and affect normal.         Labs/Imaging   Labs Reviewed   CBC W/ AUTO DIFFERENTIAL - Abnormal       Result Value    WBC 11.88      RBC 4.62      Hemoglobin 14.1      Hematocrit 43.2      MCV 94      MCH 30.5      MCHC 32.6      RDW 13.4      Platelets 325      MPV 11.6      Immature Granulocytes 0.4      Gran # (ANC) 8.6 (*)     Immature Grans (Abs) 0.05 (*)     Lymph # 2.2      Mono # 0.8      Eos # 0.3      Baso # 0.04      nRBC 0      Gran % 72.1      Lymph % 18.7      Mono % 6.3      Eosinophil % 2.2      Basophil % 0.3      Differential Method  Automated      Narrative:     Release to patient->Immediate   COMPREHENSIVE METABOLIC PANEL - Abnormal    Sodium 135 (*)     Potassium 4.3      Chloride 101      CO2 25      Glucose 93      BUN 13      Creatinine 0.9      Calcium 10.0      Total Protein 8.0      Albumin 4.1      Total Bilirubin 0.4      Alkaline Phosphatase 95      AST 19      ALT 23      eGFR >60.0      Anion Gap 9      Narrative:     Release to patient->Immediate   D DIMER, QUANTITATIVE - Abnormal    D-Dimer 0.57 (*)    URINALYSIS, REFLEX TO URINE CULTURE - Abnormal    Specimen UA Urine, Clean Catch      Color, UA Yellow      Appearance, UA Hazy (*)     pH, UA 6.0      Specific Gravity, UA 1.010      Protein, UA 1+ (*)     Glucose, UA Negative      Ketones, UA 1+ (*)     Bilirubin (UA) Negative      Occult Blood UA Trace (*)     Nitrite, UA Negative      Leukocytes, UA Negative      Narrative:     Specimen Source->Urine   URINALYSIS MICROSCOPIC - Abnormal    RBC, UA 4      WBC, UA 2      Bacteria Many (*)     Squam Epithel, UA 11      Hyaline Casts, UA 3 (*)     Microscopic Comment SEE COMMENT      Narrative:     Specimen Source->Urine   ISTAT PROCEDURE - Abnormal    POC PH 7.400      POC PCO2 46.6 (*)     POC PO2 27 (*)     POC HCO3 28.9 (*)     POC BE 4 (*)     POC SATURATED O2 51      POC TCO2 30 (*)     Sample VENOUS      Site Other      Allens Test N/A     INFLUENZA A & B BY MOLECULAR    Influenza A, Molecular Negative      Influenza B, Molecular Negative      Flu A & B Source Nasal swab     HEPATITIS C ANTIBODY    Hepatitis C Ab Non-reactive      Narrative:     Release to patient->Immediate   HIV 1 / 2 ANTIBODY    HIV 1/2 Ag/Ab Non-reactive      Narrative:     Release to patient->Immediate   TROPONIN I    Troponin I <0.006      Narrative:     Release to patient->Immediate   B-TYPE NATRIURETIC PEPTIDE    BNP <10      Narrative:     Release to patient->Immediate   SARS-COV-2 RNA AMPLIFICATION, QUAL    SARS-CoV-2 RNA, Amplification, Qual  Negative     TROPONIN I    Troponin I 0.012     CK   LIPASE   LIPASE    Lipase 11      Narrative:     Release to patient->Immediate    add on [id number] [id number] per Martine Connolly PA-C   11/20/2024  10:18 T.Ryley   CK    CPK 56      Narrative:     Release to patient->Immediate    add on [id number] Lipase-[id number] per Martine Connolly PA-C   11/20/2024  10:18 T.Ryley   TROPONIN I    Troponin I <0.006        Imaging Results              CTA Chest Non-Coronary (PE Studies) (Final result)  Result time 11/20/24 09:37:07      Final result by Deni Boyd MD (11/20/24 09:37:07)                   Impression:      1. No pulmonary embolism is identified.  2. No acute pulmonary process.      Electronically signed by: Deni Ortiz  Date:    11/20/2024  Time:    09:37               Narrative:    EXAMINATION:  CTA CHEST NON CORONARY (PE STUDIES)    CLINICAL HISTORY:  Pulmonary embolism (PE) suspected, positive D-dimer;    TECHNIQUE:  CTA of the chest was obtained. Images were reformatted and reviewed in coronal and sagittal planes. Volume-rendered 3D reconstructed images were acquired by post processing for a better visualization of the vascular anomalies, on an independent Vital workstation with concurrent supervision and archived for permanent records.  Images were acquired during the arterial phase after 75 cc of non ionic intravenous contrast administration.    Suboptimal assessment of the abdominal solid organs due to lack of a venous phase.    COMPARISON:  None.    FINDINGS:  Diagnostic quality: Adequate.    Pulmonary arteries: The pulmonary arteries are well visualized through the segmental level. No pulmonary embolism is identified.    Right heart strain: None.    Lungs and pleura: Normal.    Mediastinum and adriana: No mediastinal or hilar adenopathy.    Vessels: Minimal atherosclerotic changes in the aorta and coronary arteries.    Heart and pericardium: Heart size is normal. No  "pericardial effusion.    Upper abdomen: Cholecystectomy    Bones: Mild to moderate anterior bridging osteophytes in the thoracic spine.                                       X-Ray Chest AP Portable (Final result)  Result time 11/20/24 08:27:23      Final result by Case Hernández MD (11/20/24 08:27:23)                   Impression:      No acute cardiopulmonary finding identified on this single view.      Electronically signed by: Case Hernández MD  Date:    11/20/2024  Time:    08:27               Narrative:    EXAMINATION:  XR CHEST AP PORTABLE    CLINICAL HISTORY:  Provided history is "  Chest pain, unspecified".    TECHNIQUE:  One view of the chest.    COMPARISON:  04/20/2018.    FINDINGS:  Cardiac wires overlie the chest.  Cardiac silhouette is not enlarged.  No focal consolidation.  No sizable pleural effusion.  No pneumothorax.                                       I reviewed all labs, imaging, EKGs.     Plan   Chest pain  Nausea, vomiting  - Trop trend: <0.006, 0.012, <0.006   - dimer was slightly elevated at 0.57.  CT PE study negative for VTE or other acute process.  - chest x-ray without acute process.  - lipase within normal limits  - CPK within normal limits  - COVID, flu tests negative  - no significant metabolic derangements  - stress test without evidence of ischemia   - monitor on telemetry  - patient received nitroglycerin without any change in her chest pain   - no relief with GI cocktail  - mild relief with morphine  - GI consult due to persistent nausea, chest pressure  - multimodal pain control; patient with continuing and constant chest pressure. She does not feel comfortable with discharge home until her pain has resolved. Will continue to monitor.         I have discussed this case with DULCE Harris.     "

## 2024-11-21 NOTE — SUBJECTIVE & OBJECTIVE
Past Medical History:   Diagnosis Date    ADHD (attention deficit hyperactivity disorder)     Asthma     Cervical cancer     Colitis determined by colorectal biopsy     History of pulmonary embolus (PE)     Hyperlipidemia     Hypertension     Insomnia     Kidney stone     Neuropathy     Obesity     Renal cell carcinoma     Vitamin D deficiency        Past Surgical History:   Procedure Laterality Date     SECTION      CHOLECYSTECTOMY      HERNIA REPAIR      HYSTERECTOMY      PARTIAL NEPHRECTOMY Right        Review of patient's allergies indicates:   Allergen Reactions    Penicillins     Sulfa (sulfonamide antibiotics) Itching       No current facility-administered medications on file prior to encounter.     Current Outpatient Medications on File Prior to Encounter   Medication Sig    amitriptyline (ELAVIL) 50 MG tablet Take 50 mg by mouth every evening.    gabapentin (NEURONTIN) 300 MG capsule Take 300 mg by mouth every 12 (twelve) hours.    QUEtiapine (SEROQUEL) 50 MG tablet Take 50 mg by mouth every evening.    spironolactone (ALDACTONE) 100 MG tablet TAKE 1 TABLET BY MOUTH EVERY DAY    multivitamin (THERAGRAN) per tablet Take 1 tablet by mouth every morning.    nicotine (NICODERM CQ) 21 mg/24 hr Place 1 patch onto the skin once daily.     Family History       Problem Relation (Age of Onset)    Arthritis Father    Diabetes Mother, Father    Heart attack Mother    Heart disease Mother, Father    Hyperlipidemia Mother    Hypertension Mother          Tobacco Use    Smoking status: Every Day     Current packs/day: 0.50     Types: Cigarettes    Smokeless tobacco: Never   Substance and Sexual Activity    Alcohol use: No    Drug use: No    Sexual activity: Yes     Partners: Male     Review of Systems   Constitutional:  Negative for activity change, chills and fever.   HENT:  Negative for trouble swallowing.    Eyes:  Negative for photophobia and visual disturbance.   Respiratory:  Negative for cough, chest  tightness and shortness of breath.    Cardiovascular:  Positive for chest pain. Negative for palpitations and leg swelling.   Gastrointestinal:  Positive for diarrhea, nausea and vomiting. Negative for abdominal pain and constipation.   Genitourinary:  Negative for dysuria, frequency and hematuria.   Musculoskeletal:  Negative for back pain, gait problem and neck pain.   Skin:  Negative for rash and wound.   Neurological:  Negative for dizziness, syncope, speech difficulty and light-headedness.   Psychiatric/Behavioral:  Negative for agitation and confusion. The patient is not nervous/anxious.      Objective:     Vital Signs (Most Recent):  Temp: 98 °F (36.7 °C) (11/21/24 1135)  Pulse: 65 (11/21/24 1312)  Resp: 18 (11/21/24 1135)  BP: 97/62 (11/21/24 1312)  SpO2: 100 % (11/21/24 1312) Vital Signs (24h Range):  Temp:  [97.5 °F (36.4 °C)-98.5 °F (36.9 °C)] 98 °F (36.7 °C)  Pulse:  [65-83] 65  Resp:  [16-18] 18  SpO2:  [95 %-100 %] 100 %  BP: ()/(47-65) 97/62     Weight: 81.2 kg (179 lb)  Body mass index is 33.82 kg/m².     Physical Exam  Vitals and nursing note reviewed.   Constitutional:       General: She is not in acute distress.     Appearance: Normal appearance. She is obese. She is not ill-appearing.   HENT:      Head: Normocephalic and atraumatic.      Right Ear: External ear normal.      Left Ear: External ear normal.   Eyes:      Extraocular Movements: Extraocular movements intact.      Conjunctiva/sclera: Conjunctivae normal.      Pupils: Pupils are equal, round, and reactive to light.   Cardiovascular:      Rate and Rhythm: Normal rate and regular rhythm.      Pulses: Normal pulses.      Heart sounds: Normal heart sounds. No murmur heard.  Pulmonary:      Effort: Pulmonary effort is normal. No respiratory distress.      Breath sounds: Normal breath sounds.   Abdominal:      General: Abdomen is flat. Bowel sounds are normal.      Palpations: Abdomen is soft.      Tenderness: There is no abdominal  tenderness.   Musculoskeletal:         General: Normal range of motion.      Cervical back: Normal range of motion and neck supple. No tenderness.      Right lower leg: No edema.      Left lower leg: No edema.   Skin:     General: Skin is warm and dry.      Capillary Refill: Capillary refill takes less than 2 seconds.      Coloration: Skin is not jaundiced.   Neurological:      General: No focal deficit present.      Mental Status: She is alert and oriented to person, place, and time. Mental status is at baseline.   Psychiatric:         Mood and Affect: Mood normal.         Behavior: Behavior normal.              CRANIAL NERVES     CN III, IV, VI   Pupils are equal, round, and reactive to light.       Significant Labs: All pertinent labs within the past 24 hours have been reviewed.  BMP:   Recent Labs   Lab 11/20/24  0741   GLU 93   *   K 4.3      CO2 25   BUN 13   CREATININE 0.9   CALCIUM 10.0     CBC:   Recent Labs   Lab 11/20/24  0741   WBC 11.88   HGB 14.1   HCT 43.2        CMP:   Recent Labs   Lab 11/20/24  0741   *   K 4.3      CO2 25   GLU 93   BUN 13   CREATININE 0.9   CALCIUM 10.0   PROT 8.0   ALBUMIN 4.1   BILITOT 0.4   ALKPHOS 95   AST 19   ALT 23   ANIONGAP 9     Cardiac Markers:   Recent Labs   Lab 11/20/24  0741   BNP <10     Troponin:   Recent Labs   Lab 11/20/24  0741 11/20/24  1009 11/20/24  1300   TROPONINI <0.006 0.012 <0.006     Urine Studies:   Recent Labs   Lab 11/20/24  1200   COLORU Yellow   APPEARANCEUA Hazy*   PHUR 6.0   SPECGRAV 1.010   PROTEINUA 1+*   GLUCUA Negative   KETONESU 1+*   BILIRUBINUA Negative   OCCULTUA Trace*   NITRITE Negative   LEUKOCYTESUR Negative   RBCUA 4   WBCUA 2   BACTERIA Many*   SQUAMEPITHEL 11   HYALINECASTS 3*       Significant Imaging: I have reviewed all pertinent imaging results/findings within the past 24 hours.    Imaging Results              CTA Chest Non-Coronary (PE Studies) (Final result)  Result time 11/20/24 09:37:07       Final result by Deni Boyd MD (11/20/24 09:37:07)                   Impression:      1. No pulmonary embolism is identified.  2. No acute pulmonary process.      Electronically signed by: Deni Ortiz  Date:    11/20/2024  Time:    09:37               Narrative:    EXAMINATION:  CTA CHEST NON CORONARY (PE STUDIES)    CLINICAL HISTORY:  Pulmonary embolism (PE) suspected, positive D-dimer;    TECHNIQUE:  CTA of the chest was obtained. Images were reformatted and reviewed in coronal and sagittal planes. Volume-rendered 3D reconstructed images were acquired by post processing for a better visualization of the vascular anomalies, on an independent [employer] workstation with concurrent supervision and archived for permanent records.  Images were acquired during the arterial phase after 75 cc of non ionic intravenous contrast administration.    Suboptimal assessment of the abdominal solid organs due to lack of a venous phase.    COMPARISON:  None.    FINDINGS:  Diagnostic quality: Adequate.    Pulmonary arteries: The pulmonary arteries are well visualized through the segmental level. No pulmonary embolism is identified.    Right heart strain: None.    Lungs and pleura: Normal.    Mediastinum and adriana: No mediastinal or hilar adenopathy.    Vessels: Minimal atherosclerotic changes in the aorta and coronary arteries.    Heart and pericardium: Heart size is normal. No pericardial effusion.    Upper abdomen: Cholecystectomy    Bones: Mild to moderate anterior bridging osteophytes in the thoracic spine.                                       X-Ray Chest AP Portable (Final result)  Result time 11/20/24 08:27:23      Final result by Case Hernández MD (11/20/24 08:27:23)                   Impression:      No acute cardiopulmonary finding identified on this single view.      Electronically signed by: Case Hernández MD  Date:    11/20/2024  Time:    08:27               Narrative:    EXAMINATION:  XR  "CHEST AP PORTABLE    CLINICAL HISTORY:  Provided history is "  Chest pain, unspecified".    TECHNIQUE:  One view of the chest.    COMPARISON:  04/20/2018.    FINDINGS:  Cardiac wires overlie the chest.  Cardiac silhouette is not enlarged.  No focal consolidation.  No sizable pleural effusion.  No pneumothorax.                                    "

## 2024-11-21 NOTE — HPI
The patient is a 54 year old female with significant PMHx significant for HTN/HLD (stopped meds after weight loss through diet), obesity, PE after childbirth in her 20's not currently on AC, asthma, cervical cancer s/p hysterectomy, renal cell carcinoma s/p right partial nephrectomy, colitis, kidney stones, insomnia, neuropathy, and tobacco use (quit 3 weeks ago) admitted to hospital medicine for intractable N/V/D with associated chest discomfort for the past 3 days. She describes the chest pain as a constant, heaviness/pressure that radiates to her back.  States that she recently moved to [place] from [place] with her , and that on Sunday she was hooking up a gas clothes dryer in her new place and began smelling gas fumes. She states that shortly after is when her symptoms began. Reports she presented to the ED as her chest pressure was worsening as the n/v persisted. Denies fever/chills, diaphoresis, lightheadedness, HA, SOB, cough, congestion, urinary symptoms, changes in BMs, numbness/tingling, weakness. Of note, she reports multiple ER visits for chest pain while living in [place] in the past, but states that she has never had a stress test in the past. States that her mother had a triple bypass at age 50, and her father had a heart attack in his 70s.     Patient was admitted to the [facility] for which work up showed negative trop x2. BNP WNL. D dimer slightly elevated. UA noninfectious. Carboxyhemoglobin WNL. CTA negative for PE. CXR unremarkable. Stress echo negative for ischemia. Given MM pain regimen, GI cocktail, and antiemetics without significant improvement in symptoms. Patient still unable to tolerate PO intake. GI consulted, plan for EGD tomorrow.

## 2024-11-21 NOTE — ASSESSMENT & PLAN NOTE
Diarrhea  - reports n/v/d for the past 3 days  - VSS  - labs largely unremarkable including carboxyhemoglobin   - CP work up without signs of ischemia  - give 1L IVF in the ED  - started on bentyl, sucralfate, prn antiemetics  - stool studies pending  - GI consulted, report There is no evidence of bloody stool or emesis to indicate Daniela-Stanford, and chest x-ray without findings concerning for esophageal perforation. Given the patient's inability to tolerate po intake and persistent vomiting, we will perform EGD  - plan for EGD tomorrow  - CLD, NPO at [facility]  [facility]

## 2024-11-21 NOTE — SUBJECTIVE & OBJECTIVE
Past Medical History:   Diagnosis Date    ADHD (attention deficit hyperactivity disorder)     Asthma     Cervical cancer     Colitis determined by colorectal biopsy     History of pulmonary embolus (PE)     Hyperlipidemia     Hypertension     Insomnia     Kidney stone     Neuropathy     Obesity     Renal cell carcinoma     Vitamin D deficiency        Past Surgical History:   Procedure Laterality Date     SECTION      CHOLECYSTECTOMY      HERNIA REPAIR      HYSTERECTOMY      PARTIAL NEPHRECTOMY Right        Review of patient's allergies indicates:   Allergen Reactions    Penicillins     Sulfa (sulfonamide antibiotics) Itching     Family History       Problem Relation (Age of Onset)    Arthritis Father    Diabetes Mother, Father    Heart attack Mother    Heart disease Mother, Father    Hyperlipidemia Mother    Hypertension Mother          Tobacco Use    Smoking status: Every Day     Current packs/day: 0.50     Types: Cigarettes    Smokeless tobacco: Never   Substance and Sexual Activity    Alcohol use: No    Drug use: No    Sexual activity: Yes     Partners: Male     Review of Systems   Constitutional:  Positive for chills and fatigue. Negative for fever.   HENT:  Positive for trouble swallowing (Not mechanically, but dry mouth).    Cardiovascular:  Positive for chest pain.   Gastrointestinal:  Positive for diarrhea, nausea and vomiting. Negative for abdominal distention, abdominal pain, blood in stool and constipation.     Objective:     Vital Signs (Most Recent):  Temp: 97.7 °F (36.5 °C) (24)  Pulse: 81 (24)  Resp: 18 (24)  BP: 94/60 (24)  SpO2: 95 % (24) Vital Signs (24h Range):  Temp:  [97.5 °F (36.4 °C)-98.8 °F (37.1 °C)] 97.7 °F (36.5 °C)  Pulse:  [77-91] 81  Resp:  [16-20] 18  SpO2:  [95 %-99 %] 95 %  BP: ()/(55-65) 94/60     Weight: 81.2 kg (179 lb) (24 1554)  Body mass index is 33.82 kg/m².      Intake/Output Summary (Last 24 hours) at  11/21/2024 1055  Last data filed at 11/21/2024 0611  Gross per 24 hour   Intake 1250 ml   Output --   Net 1250 ml       Lines/Drains/Airways       Peripheral Intravenous Line  Duration                  Peripheral IV - Single Lumen 11/20/24 0741 20 G 1 3/4 in Left Antecubital 1 day                     Physical Exam  Vitals reviewed.   Constitutional:       General: She is not in acute distress.     Appearance: Normal appearance. She is ill-appearing.   HENT:      Head: Normocephalic and atraumatic.      Mouth/Throat:      Mouth: Mucous membranes are dry.      Pharynx: Oropharynx is clear.   Eyes:      General: No scleral icterus.  Pulmonary:      Effort: Pulmonary effort is normal. No respiratory distress.   Abdominal:      General: Abdomen is flat. There is no distension.      Palpations: Abdomen is soft.      Tenderness: There is no abdominal tenderness. There is no guarding.   Skin:     General: Skin is warm and dry.   Neurological:      Mental Status: She is alert and oriented to person, place, and time.   Psychiatric:         Mood and Affect: Mood normal.         Behavior: Behavior normal.         Thought Content: Thought content normal.         Judgment: Judgment normal.          Significant Labs:  CBC:   Recent Labs   Lab 11/20/24  0741   WBC 11.88   HGB 14.1   HCT 43.2        CMP:   Recent Labs   Lab 11/20/24  0741   GLU 93   CALCIUM 10.0   ALBUMIN 4.1   PROT 8.0   *   K 4.3   CO2 25      BUN 13   CREATININE 0.9   ALKPHOS 95   ALT 23   AST 19   BILITOT 0.4       Significant Imaging:  Imaging results within the past 24 hours have been reviewed.

## 2024-11-21 NOTE — ASSESSMENT & PLAN NOTE
Patients blood pressure range in the last 24 hours was: BP  Min: 81/47  Max: 120/57.The patient's inpatient anti-hypertensive regimen is listed below:  Current Antihypertensives  spironolactone tablet 100 mg, Nightly, Oral  nitroGLYCERIN SL tablet 0.4 mg, Every 5 min PRN, Sublingual    Plan  - BP is controlled, no changes needed to their regimen  - Will hold spironolactone given GI losses, restart when medically indicated

## 2024-11-21 NOTE — ASSESSMENT & PLAN NOTE
Patient with constant chest pain for 4 days with associated chills, nausea, vomiting, and watery, non-bloody diarrhea. Stress echo and CTA negative for stress induced cardiac ischemia and PE. It is thought that the patient's chest pain could be related to the patient's persistent symptoms of nausea, and vomiting and diarrhea in the setting of a GI infection. There is no evidence of bloody stool or emesis to indicate Daniela-Stanford, and chest x-ray without findings concerning for esophageal perforation. Given the patient's inability to tolerate po intake and persistent vomiting, we will perform EGD.   Plan:   - Collect the following:   - Stool C. Diff  - Stool Culture  - GI pathogens panel   - Plan for EGD tomorrow   - Clear liquid diet today, make NPO at midnight    - Hold all NSAIDs and anticoagulants, unless contraindicated  - Please correct any coagulopathy with platelets and FFP for goal of platelets >50K and INR <2.0  - Please notify GI team if there is significant change in patient's clinical status

## 2024-11-21 NOTE — CONSULTS
[facility] - Emergency Dept  Gastroenterology  Consult Note    Patient Name: Maricruz RODRIGUEZ  MRN: [id number]  Admission Date: 11/20/2024  Hospital Length of Stay: 0 days  Code Status: Full Code   Attending Provider: No att. providers found   Consulting Provider: Caleb Virk MD  Primary Care Physician: Mario Saldana MD (Inactive)  Principal Problem:Chest pain    Inpatient consult to Gastroenterology  Consult performed by: Caleb Virk MD  Consult ordered by: Martine Connolly PA-C        Subjective:     HPI:  Maricruz Rodriguez is a 54 year old female with a past medical history of HTN/HLD (stopped meds after weight loss through diet), obesity, PE after childbirth in her 20's not currently on AC, asthma, cervical cancer s/p hysterectomy, renal cell carcinoma s/p right partial nephrectomy, colitis, kidney stones, neuropathy, and tobacco use (quit 3 weeks ago).      She presented to the ER for an emergent evaluation with a chief complaint of chest pain. She describes the pain as a constant heaviness and pressure, like something is sitting on her chest that began Sunday but acutely worsened Wednesday, waking her from sleep and prompting her presentation. She states that the pain seems to radiate around her left flank into her back and down her left arm, with a numbness/tingling in her left arm. She also has been experiencing associated nausea, vomiting, and diarrhea since symptom onset on Sunday.     She states that she recently moved to [place] from [place] with her , and that on Sunday she was hooking up a gas clothes dryer in her new place and began smelling gas fumes. She states that she became very nauseated and shortly thereafter began having the N/V/D and chest pain. The symptoms persisted despite turning off the gas supply. She notes continuous non-bloody, watery diarrhea from Sunday to Wednesday, but has not had any diarrhea since arrival in the ED. She also notes that she has been vomiting everything  she eats, but her only difficulty with swallowing is due to dry mouth. She describes her emesis as bilious, and green in color. She denies any abdominal pain other than soreness from vomiting/dry heaving. She denies any sick contacts or family with similar symptoms. Patient is not on any immunosuppressant medications.     Patient had a stress echo performed which was negative for any stress induced cardiac ischemia. CTA chest in the ED without any evidence of PE. Of note, she reports that her mother had a triple bypass at age 50.  She states that her father had a heart attack in his 70s.  Patient reports an extensive smoking history about 16.5 pack-year history. Quit 3 weeks ago.     Past Medical History:   Diagnosis Date    ADHD (attention deficit hyperactivity disorder)     Asthma     Cervical cancer     Colitis determined by colorectal biopsy     History of pulmonary embolus (PE)     Hyperlipidemia     Hypertension     Insomnia     Kidney stone     Neuropathy     Obesity     Renal cell carcinoma     Vitamin D deficiency        Past Surgical History:   Procedure Laterality Date     SECTION      CHOLECYSTECTOMY      HERNIA REPAIR      HYSTERECTOMY      PARTIAL NEPHRECTOMY Right        Review of patient's allergies indicates:   Allergen Reactions    Penicillins     Sulfa (sulfonamide antibiotics) Itching     Family History       Problem Relation (Age of Onset)    Arthritis Father    Diabetes Mother, Father    Heart attack Mother    Heart disease Mother, Father    Hyperlipidemia Mother    Hypertension Mother          Tobacco Use    Smoking status: Every Day     Current packs/day: 0.50     Types: Cigarettes    Smokeless tobacco: Never   Substance and Sexual Activity    Alcohol use: No    Drug use: No    Sexual activity: Yes     Partners: Male     Review of Systems   Constitutional:  Positive for chills and fatigue. Negative for fever.   HENT:  Positive for trouble swallowing (Not mechanically, but dry mouth).     Cardiovascular:  Positive for chest pain.   Gastrointestinal:  Positive for diarrhea, nausea and vomiting. Negative for abdominal distention, abdominal pain, blood in stool and constipation.     Objective:     Vital Signs (Most Recent):  Temp: 97.7 °F (36.5 °C) (11/21/24 0725)  Pulse: 81 (11/21/24 0725)  Resp: 18 (11/21/24 0725)  BP: 94/60 (11/21/24 0725)  SpO2: 95 % (11/21/24 0725) Vital Signs (24h Range):  Temp:  [97.5 °F (36.4 °C)-98.8 °F (37.1 °C)] 97.7 °F (36.5 °C)  Pulse:  [77-91] 81  Resp:  [16-20] 18  SpO2:  [95 %-99 %] 95 %  BP: ()/(55-65) 94/60     Weight: 81.2 kg (179 lb) (11/20/24 1554)  Body mass index is 33.82 kg/m².      Intake/Output Summary (Last 24 hours) at 11/21/2024 1055  Last data filed at 11/21/2024 0611  Gross per 24 hour   Intake 1250 ml   Output --   Net 1250 ml       Lines/Drains/Airways       Peripheral Intravenous Line  Duration                  Peripheral IV - Single Lumen 11/20/24 0741 20 G 1 3/4 in Left Antecubital 1 day                     Physical Exam  Vitals reviewed.   Constitutional:       General: She is not in acute distress.     Appearance: Normal appearance. She is ill-appearing.   HENT:      Head: Normocephalic and atraumatic.      Mouth/Throat:      Mouth: Mucous membranes are dry.      Pharynx: Oropharynx is clear.   Eyes:      General: No scleral icterus.  Pulmonary:      Effort: Pulmonary effort is normal. No respiratory distress.   Abdominal:      General: Abdomen is flat. There is no distension.      Palpations: Abdomen is soft.      Tenderness: There is no abdominal tenderness. There is no guarding.   Skin:     General: Skin is warm and dry.   Neurological:      Mental Status: She is alert and oriented to person, place, and time.   Psychiatric:         Mood and Affect: Mood normal.         Behavior: Behavior normal.         Thought Content: Thought content normal.         Judgment: Judgment normal.          Significant Labs:  CBC:   Recent Labs   Lab  11/20/24  0741   WBC 11.88   HGB 14.1   HCT 43.2        CMP:   Recent Labs   Lab 11/20/24  0741   GLU 93   CALCIUM 10.0   ALBUMIN 4.1   PROT 8.0   *   K 4.3   CO2 25      BUN 13   CREATININE 0.9   ALKPHOS 95   ALT 23   AST 19   BILITOT 0.4       Significant Imaging:  Imaging results within the past 24 hours have been reviewed.  Assessment/Plan:     Cardiac/Vascular  * Chest pain  Patient with constant chest pain for 4 days with associated chills, nausea, vomiting, and watery, non-bloody diarrhea. Stress echo and CTA negative for stress induced cardiac ischemia and PE. It is thought that the patient's chest pain could be related to the patient's persistent symptoms of nausea, and vomiting and diarrhea in the setting of a GI infection. There is no evidence of bloody stool or emesis to indicate Daniela-[facility], and chest x-ray without findings concerning for esophageal perforation. Given the patient's inability to tolerate po intake and persistent vomiting, we will perform EGD.   Plan:   - Collect the following:   - Stool C. Diff  - Stool Culture  - GI pathogens panel   - Plan for EGD tomorrow   - Clear liquid diet today, make NPO at midnight    - Hold all NSAIDs and anticoagulants, unless contraindicated  - Please correct any coagulopathy with platelets and FFP for goal of platelets >50K and INR <2.0  - Please notify GI team if there is significant change in patient's clinical status         Thank you for your consult. I will follow-up with patient. Please contact us if you have any additional questions.    Caleb Virk MD  Gastroenterology  [facility] - Emergency Dept

## 2024-11-21 NOTE — H&P
"  [facility] - Emergency Dept  Hospital Medicine  History & Physical    Patient Name: Maricruz RODRIGUEZ  MRN: [id number]  Patient Class: OP- Observation  Admission Date: 11/20/2024  Attending Physician: Tali Covarrubias MD   Primary Care Provider: Mario Saldana MD (Inactive)         Patient information was obtained from patient and ER records.     Subjective:     Principal Problem:Intractable nausea and vomiting    Chief Complaint:   Chief Complaint   Patient presents with    Chest Pain     Starting last night at 12.  Diarrhea and vomiting for last 3 days "can't keep anything down".  Endorses muscle cramps.         HPI: The patient is a 54 year old female with significant PMHx significant for HTN/HLD (stopped meds after weight loss through diet), obesity, PE after childbirth in her 20's not currently on AC, asthma, cervical cancer s/p hysterectomy, renal cell carcinoma s/p right partial nephrectomy, colitis, kidney stones, insomnia, neuropathy, and tobacco use (quit 3 weeks ago) admitted to hospital medicine for intractable N/V/D with associated chest discomfort for the past 3 days. She describes the chest pain as a constant, heaviness/pressure that radiates to her back.  States that she recently moved to [place] from [place] with her , and that on Sunday she was hooking up a gas clothes dryer in her new place and began smelling gas fumes. She states that shortly after is when her symptoms began. Reports she presented to the ED as her chest pressure was worsening as the n/v persisted. Denies fever/chills, diaphoresis, lightheadedness, HA, SOB, cough, congestion, urinary symptoms, changes in BMs, numbness/tingling, weakness. Of note, she reports multiple ER visits for chest pain while living in [place] in the past, but states that she has never had a stress test in the past. States that her mother had a triple bypass at age 50, and her father had a heart attack in his 70s.     Patient was admitted to the EDOU " for which work up showed negative trop x2. BNP WNL. D dimer slightly elevated. UA noninfectious. Carboxyhemoglobin WNL. CTA negative for PE. CXR unremarkable. Stress echo negative for ischemia. Given MM pain regimen, GI cocktail, and antiemetics without significant improvement in symptoms. Patient still unable to tolerate PO intake. GI consulted, plan for EGD tomorrow.     Past Medical History:   Diagnosis Date    ADHD (attention deficit hyperactivity disorder)     Asthma     Cervical cancer     Colitis determined by colorectal biopsy     History of pulmonary embolus (PE)     Hyperlipidemia     Hypertension     Insomnia     Kidney stone     Neuropathy     Obesity     Renal cell carcinoma     Vitamin D deficiency        Past Surgical History:   Procedure Laterality Date     SECTION      CHOLECYSTECTOMY      HERNIA REPAIR      HYSTERECTOMY      PARTIAL NEPHRECTOMY Right        Review of patient's allergies indicates:   Allergen Reactions    Penicillins     Sulfa (sulfonamide antibiotics) Itching       No current facility-administered medications on file prior to encounter.     Current Outpatient Medications on File Prior to Encounter   Medication Sig    amitriptyline (ELAVIL) 50 MG tablet Take 50 mg by mouth every evening.    gabapentin (NEURONTIN) 300 MG capsule Take 300 mg by mouth every 12 (twelve) hours.    QUEtiapine (SEROQUEL) 50 MG tablet Take 50 mg by mouth every evening.    spironolactone (ALDACTONE) 100 MG tablet TAKE 1 TABLET BY MOUTH EVERY DAY    multivitamin (THERAGRAN) per tablet Take 1 tablet by mouth every morning.    nicotine (NICODERM CQ) 21 mg/24 hr Place 1 patch onto the skin once daily.     Family History       Problem Relation (Age of Onset)    Arthritis Father    Diabetes Mother, Father    Heart attack Mother    Heart disease Mother, Father    Hyperlipidemia Mother    Hypertension Mother          Tobacco Use    Smoking status: Every Day     Current packs/day: 0.50     Types: Cigarettes     Smokeless tobacco: Never   Substance and Sexual Activity    Alcohol use: No    Drug use: No    Sexual activity: Yes     Partners: Male     Review of Systems   Constitutional:  Negative for activity change, chills and fever.   HENT:  Negative for trouble swallowing.    Eyes:  Negative for photophobia and visual disturbance.   Respiratory:  Negative for cough, chest tightness and shortness of breath.    Cardiovascular:  Positive for chest pain. Negative for palpitations and leg swelling.   Gastrointestinal:  Positive for diarrhea, nausea and vomiting. Negative for abdominal pain and constipation.   Genitourinary:  Negative for dysuria, frequency and hematuria.   Musculoskeletal:  Negative for back pain, gait problem and neck pain.   Skin:  Negative for rash and wound.   Neurological:  Negative for dizziness, syncope, speech difficulty and light-headedness.   Psychiatric/Behavioral:  Negative for agitation and confusion. The patient is not nervous/anxious.      Objective:     Vital Signs (Most Recent):  Temp: 98 °F (36.7 °C) (11/21/24 1135)  Pulse: 65 (11/21/24 1312)  Resp: 18 (11/21/24 1135)  BP: 97/62 (11/21/24 1312)  SpO2: 100 % (11/21/24 1312) Vital Signs (24h Range):  Temp:  [97.5 °F (36.4 °C)-98.5 °F (36.9 °C)] 98 °F (36.7 °C)  Pulse:  [65-83] 65  Resp:  [16-18] 18  SpO2:  [95 %-100 %] 100 %  BP: ()/(47-65) 97/62     Weight: 81.2 kg (179 lb)  Body mass index is 33.82 kg/m².     Physical Exam  Vitals and nursing note reviewed.   Constitutional:       General: She is not in acute distress.     Appearance: Normal appearance. She is obese. She is not ill-appearing.   HENT:      Head: Normocephalic and atraumatic.      Right Ear: External ear normal.      Left Ear: External ear normal.   Eyes:      Extraocular Movements: Extraocular movements intact.      Conjunctiva/sclera: Conjunctivae normal.      Pupils: Pupils are equal, round, and reactive to light.   Cardiovascular:      Rate and Rhythm: Normal rate  and regular rhythm.      Pulses: Normal pulses.      Heart sounds: Normal heart sounds. No murmur heard.  Pulmonary:      Effort: Pulmonary effort is normal. No respiratory distress.      Breath sounds: Normal breath sounds.   Abdominal:      General: Abdomen is flat. Bowel sounds are normal.      Palpations: Abdomen is soft.      Tenderness: There is no abdominal tenderness.   Musculoskeletal:         General: Normal range of motion.      Cervical back: Normal range of motion and neck supple. No tenderness.      Right lower leg: No edema.      Left lower leg: No edema.   Skin:     General: Skin is warm and dry.      Capillary Refill: Capillary refill takes less than 2 seconds.      Coloration: Skin is not jaundiced.   Neurological:      General: No focal deficit present.      Mental Status: She is alert and oriented to person, place, and time. Mental status is at baseline.   Psychiatric:         Mood and Affect: Mood normal.         Behavior: Behavior normal.              CRANIAL NERVES     CN III, IV, VI   Pupils are equal, round, and reactive to light.       Significant Labs: All pertinent labs within the past 24 hours have been reviewed.  BMP:   Recent Labs   Lab 11/20/24  0741   GLU 93   *   K 4.3      CO2 25   BUN 13   CREATININE 0.9   CALCIUM 10.0     CBC:   Recent Labs   Lab 11/20/24  0741   WBC 11.88   HGB 14.1   HCT 43.2        CMP:   Recent Labs   Lab 11/20/24  0741   *   K 4.3      CO2 25   GLU 93   BUN 13   CREATININE 0.9   CALCIUM 10.0   PROT 8.0   ALBUMIN 4.1   BILITOT 0.4   ALKPHOS 95   AST 19   ALT 23   ANIONGAP 9     Cardiac Markers:   Recent Labs   Lab 11/20/24  0741   BNP <10     Troponin:   Recent Labs   Lab 11/20/24  0741 11/20/24  1009 11/20/24  1300   TROPONINI <0.006 0.012 <0.006     Urine Studies:   Recent Labs   Lab 11/20/24  1200   COLORU Yellow   APPEARANCEUA Hazy*   PHUR 6.0   SPECGRAV 1.010   PROTEINUA 1+*   GLUCUA Negative   KETONESU 1+*   BILIRUBINUA  Negative   OCCULTUA Trace*   NITRITE Negative   LEUKOCYTESUR Negative   RBCUA 4   WBCUA 2   BACTERIA Many*   SQUAMEPITHEL 11   HYALINECASTS 3*       Significant Imaging: I have reviewed all pertinent imaging results/findings within the past 24 hours.    Imaging Results              CTA Chest Non-Coronary (PE Studies) (Final result)  Result time 11/20/24 09:37:07      Final result by Deni Boyd MD (11/20/24 09:37:07)                   Impression:      1. No pulmonary embolism is identified.  2. No acute pulmonary process.      Electronically signed by: Deni Ortiz  Date:    11/20/2024  Time:    09:37               Narrative:    EXAMINATION:  CTA CHEST NON CORONARY (PE STUDIES)    CLINICAL HISTORY:  Pulmonary embolism (PE) suspected, positive D-dimer;    TECHNIQUE:  CTA of the chest was obtained. Images were reformatted and reviewed in coronal and sagittal planes. Volume-rendered 3D reconstructed images were acquired by post processing for a better visualization of the vascular anomalies, on an independent [employer] workstation with concurrent supervision and archived for permanent records.  Images were acquired during the arterial phase after 75 cc of non ionic intravenous contrast administration.    Suboptimal assessment of the abdominal solid organs due to lack of a venous phase.    COMPARISON:  None.    FINDINGS:  Diagnostic quality: Adequate.    Pulmonary arteries: The pulmonary arteries are well visualized through the segmental level. No pulmonary embolism is identified.    Right heart strain: None.    Lungs and pleura: Normal.    Mediastinum and adriana: No mediastinal or hilar adenopathy.    Vessels: Minimal atherosclerotic changes in the aorta and coronary arteries.    Heart and pericardium: Heart size is normal. No pericardial effusion.    Upper abdomen: Cholecystectomy    Bones: Mild to moderate anterior bridging osteophytes in the thoracic spine.                                        "X-Ray Chest AP Portable (Final result)  Result time 11/20/24 08:27:23      Final result by Case Hernández MD (11/20/24 08:27:23)                   Impression:      No acute cardiopulmonary finding identified on this single view.      Electronically signed by: Case Hernández MD  Date:    11/20/2024  Time:    08:27               Narrative:    EXAMINATION:  XR CHEST AP PORTABLE    CLINICAL HISTORY:  Provided history is "  Chest pain, unspecified".    TECHNIQUE:  One view of the chest.    COMPARISON:  04/20/2018.    FINDINGS:  Cardiac wires overlie the chest.  Cardiac silhouette is not enlarged.  No focal consolidation.  No sizable pleural effusion.  No pneumothorax.                                    Assessment/Plan:     * Intractable nausea and vomiting  Diarrhea  - reports n/v/d for the past 3 days  - VSS  - labs largely unremarkable including carboxyhemoglobin   - CP work up without signs of ischemia  - give 1L IVF in the ED  - started on bentyl, sucralfate, prn antiemetics  - stool studies pending  - GI consulted, report There is no evidence of bloody stool or emesis to indicate Daniela-Stanford, and chest x-ray without findings concerning for esophageal perforation. Given the patient's inability to tolerate po intake and persistent vomiting, we will perform EGD  - plan for EGD tomorrow  - CLD, NPO at MN  - pedialyte for hydration      Chest pain  - Acute, patient reports chest pain is ongoing  - EKG without ST-segment elevation or depression  - CXR unremarkable  - troponin negative x 2  - ASA administered in the ED.  - Cardiac monitoring  - PRN EKG and sl nitro for chest pain  - K>4, Mg>2  - Stress echo below      Left Ventricle: The left ventricle is normal in size. Normal wall thickness. There is normal systolic function with a visually estimated ejection fraction of 55 - 60%. There is normal diastolic function.    Right Ventricle: Normal right ventricular cavity size. Wall thickness is normal. Systolic " function is normal.    IVC/SVC: Normal venous pressure at 3 mmHg.    Stress Protocol: The patient exercised for 6 minutes 29 seconds on a high ramp protocol, corresponding to a functional capacity of 10METS, achieving a peak heart rate of 139 bpm, which is 87% of the age predicted maximum heart rate. Their exercise capacity was average. The patient reported chest tightness 8/10 increased to 10/10 pretest, nausea and light headedness during the stress test. The test was stopped because the patient experienced nausea.    Baseline ECG: The Baseline ECG reveals sinus rhythm. The axis is normal. The ST segments are normal.    Stress ECG: There is no ST segment deviation identified during the protocol. There are no arrhythmias during stress. There is normal blood pressure response with stress.    ECG Conclusion: The ECG portion of the study is negative for ischemia.    Post-stress Echo: The left ventricle systolic function is hyperdynamic with an EF of 75%. Right ventricle cavity size is small. Right ventricle systolic function is hyperdynamic.    Post-stress Conclusion: The study is negative with no echocardiographic evidence of stress induced ischemia.        Benign hypertension  Patients blood pressure range in the last 24 hours was: BP  Min: 81/47  Max: 120/57.The patient's inpatient anti-hypertensive regimen is listed below:  Current Antihypertensives  spironolactone tablet 100 mg, Nightly, Oral  nitroGLYCERIN SL tablet 0.4 mg, Every 5 min PRN, Sublingual    Plan  - BP is controlled, no changes needed to their regimen  - Will hold spironolactone given GI losses, restart when medically indicated    Hyperlipidemia  - not on a statin at home  - lipid panel in the AM        VTE Risk Mitigation (From admission, onward)           Ordered     IP VTE HIGH RISK PATIENT  Once         11/21/24 1432     Place sequential compression device  Until discontinued         11/21/24 1431     Place sequential compression device  Until  discontinued         11/20/24 1145     Place ANDREA hose  Until discontinued         11/20/24 1145                         On 11/21/2024, patient should be placed in hospital observation services under my care in collaboration with Tali Covarrubias MD.      Patient placed on special precautions, awaiting stool sample. Will continue to monitor,.       Maureen Biggs PA-C  Department of Hospital Medicine  [facility] - Emergency Dept

## 2024-11-21 NOTE — ASSESSMENT & PLAN NOTE
- Acute, patient reports chest pain is ongoing  - EKG without ST-segment elevation or depression  - CXR unremarkable  - troponin negative x 2  - ASA administered in the ED.  - Cardiac monitoring  - PRN EKG and sl nitro for chest pain  - K>4, Mg>2  - Stress echo below      Left Ventricle: The left ventricle is normal in size. Normal wall thickness. There is normal systolic function with a visually estimated ejection fraction of 55 - 60%. There is normal diastolic function.    Right Ventricle: Normal right ventricular cavity size. Wall thickness is normal. Systolic function is normal.    IVC/SVC: Normal venous pressure at 3 mmHg.    Stress Protocol: The patient exercised for 6 minutes 29 seconds on a high ramp protocol, corresponding to a functional capacity of 10METS, achieving a peak heart rate of 139 bpm, which is 87% of the age predicted maximum heart rate. Their exercise capacity was average. The patient reported chest tightness 8/10 increased to 10/10 pretest, nausea and light headedness during the stress test. The test was stopped because the patient experienced nausea.    Baseline ECG: The Baseline ECG reveals sinus rhythm. The axis is normal. The ST segments are normal.    Stress ECG: There is no ST segment deviation identified during the protocol. There are no arrhythmias during stress. There is normal blood pressure response with stress.    ECG Conclusion: The ECG portion of the study is negative for ischemia.    Post-stress Echo: The left ventricle systolic function is hyperdynamic with an EF of 75%. Right ventricle cavity size is small. Right ventricle systolic function is hyperdynamic.    Post-stress Conclusion: The study is negative with no echocardiographic evidence of stress induced ischemia.

## 2024-11-21 NOTE — ASSESSMENT & PLAN NOTE
Patient with constant chest pain for 4 days with associated nausea, vomiting, watery, non-bloody diarrhea, and chills. Stress echo and CTA negative for stress induced cardiac ischemia and PE. It is thought that the patient's chest pain could be related to the patient's persistent symptoms of nausea, and vomiting and diarrhea in the setting of a GI infection. There is no evidence of bloody stool or emesis to indicate Daniela-Stanford, and chest x-ray without findings concerning for esophageal perforation. Will evaluate for potential infectious causes at this time.     Plan:   - Collect the following:   - Stool C. Diff  - Stool Culture  - GI pathogens panel

## 2024-11-22 ENCOUNTER — ANESTHESIA (OUTPATIENT)
Dept: ENDOSCOPY | Facility: HOSPITAL | Age: 54
End: 2024-11-22
Payer: COMMERCIAL

## 2024-11-22 VITALS
SYSTOLIC BLOOD PRESSURE: 101 MMHG | HEIGHT: 61 IN | BODY MASS INDEX: 33.79 KG/M2 | WEIGHT: 179 LBS | HEART RATE: 80 BPM | OXYGEN SATURATION: 99 % | TEMPERATURE: 99 F | DIASTOLIC BLOOD PRESSURE: 51 MMHG | RESPIRATION RATE: 11 BRPM

## 2024-11-22 DIAGNOSIS — K31.84 GASTROPARESIS: Primary | ICD-10-CM

## 2024-11-22 LAB
ALBUMIN SERPL BCP-MCNC: 3.2 G/DL (ref 3.5–5.2)
ALP SERPL-CCNC: 133 U/L (ref 40–150)
ALT SERPL W/O P-5'-P-CCNC: 98 U/L (ref 10–44)
ANION GAP SERPL CALC-SCNC: 8 MMOL/L (ref 8–16)
AST SERPL-CCNC: 54 U/L (ref 10–40)
BASOPHILS # BLD AUTO: 0.03 K/UL (ref 0–0.2)
BASOPHILS NFR BLD: 0.4 % (ref 0–1.9)
BILIRUB SERPL-MCNC: 0.2 MG/DL (ref 0.1–1)
BUN SERPL-MCNC: 12 MG/DL (ref 6–20)
CALCIUM SERPL-MCNC: 9 MG/DL (ref 8.7–10.5)
CHLORIDE SERPL-SCNC: 104 MMOL/L (ref 95–110)
CHOLEST SERPL-MCNC: 159 MG/DL (ref 120–199)
CHOLEST/HDLC SERPL: 4.1 {RATIO} (ref 2–5)
CO2 SERPL-SCNC: 26 MMOL/L (ref 23–29)
CREAT SERPL-MCNC: 0.7 MG/DL (ref 0.5–1.4)
DIFFERENTIAL METHOD BLD: ABNORMAL
EOSINOPHIL # BLD AUTO: 0.3 K/UL (ref 0–0.5)
EOSINOPHIL NFR BLD: 3.9 % (ref 0–8)
ERYTHROCYTE [DISTWIDTH] IN BLOOD BY AUTOMATED COUNT: 13.6 % (ref 11.5–14.5)
EST. GFR  (NO RACE VARIABLE): >60 ML/MIN/1.73 M^2
GLUCOSE SERPL-MCNC: 76 MG/DL (ref 70–110)
HCT VFR BLD AUTO: 37.1 % (ref 37–48.5)
HDLC SERPL-MCNC: 39 MG/DL (ref 40–75)
HDLC SERPL: 24.5 % (ref 20–50)
HGB BLD-MCNC: 11.8 G/DL (ref 12–16)
IMM GRANULOCYTES # BLD AUTO: 0.02 K/UL (ref 0–0.04)
IMM GRANULOCYTES NFR BLD AUTO: 0.2 % (ref 0–0.5)
LDLC SERPL CALC-MCNC: 88 MG/DL (ref 63–159)
LYMPHOCYTES # BLD AUTO: 1.9 K/UL (ref 1–4.8)
LYMPHOCYTES NFR BLD: 23.4 % (ref 18–48)
MAGNESIUM SERPL-MCNC: 1.9 MG/DL (ref 1.6–2.6)
MCH RBC QN AUTO: 30.6 PG (ref 27–31)
MCHC RBC AUTO-ENTMCNC: 31.8 G/DL (ref 32–36)
MCV RBC AUTO: 96 FL (ref 82–98)
MONOCYTES # BLD AUTO: 0.5 K/UL (ref 0.3–1)
MONOCYTES NFR BLD: 6.1 % (ref 4–15)
NEUTROPHILS # BLD AUTO: 5.4 K/UL (ref 1.8–7.7)
NEUTROPHILS NFR BLD: 66 % (ref 38–73)
NONHDLC SERPL-MCNC: 120 MG/DL
NRBC BLD-RTO: 0 /100 WBC
PHOSPHATE SERPL-MCNC: 3.1 MG/DL (ref 2.7–4.5)
PLATELET # BLD AUTO: 267 K/UL (ref 150–450)
PMV BLD AUTO: 12.1 FL (ref 9.2–12.9)
POTASSIUM SERPL-SCNC: 4 MMOL/L (ref 3.5–5.1)
PROT SERPL-MCNC: 6.2 G/DL (ref 6–8.4)
RBC # BLD AUTO: 3.86 M/UL (ref 4–5.4)
SODIUM SERPL-SCNC: 138 MMOL/L (ref 136–145)
TRIGL SERPL-MCNC: 160 MG/DL (ref 30–150)
WBC # BLD AUTO: 8.16 K/UL (ref 3.9–12.7)

## 2024-11-22 PROCEDURE — 84100 ASSAY OF PHOSPHORUS: CPT | Performed by: PHYSICIAN ASSISTANT

## 2024-11-22 PROCEDURE — 83735 ASSAY OF MAGNESIUM: CPT | Performed by: PHYSICIAN ASSISTANT

## 2024-11-22 PROCEDURE — 63600175 PHARM REV CODE 636 W HCPCS: Performed by: PHYSICIAN ASSISTANT

## 2024-11-22 PROCEDURE — 36415 COLL VENOUS BLD VENIPUNCTURE: CPT | Performed by: PHYSICIAN ASSISTANT

## 2024-11-22 PROCEDURE — 25000003 PHARM REV CODE 250: Performed by: PHYSICIAN ASSISTANT

## 2024-11-22 PROCEDURE — 80053 COMPREHEN METABOLIC PANEL: CPT | Performed by: PHYSICIAN ASSISTANT

## 2024-11-22 PROCEDURE — 43235 EGD DIAGNOSTIC BRUSH WASH: CPT | Mod: ,,, | Performed by: STUDENT IN AN ORGANIZED HEALTH CARE EDUCATION/TRAINING PROGRAM

## 2024-11-22 PROCEDURE — G0378 HOSPITAL OBSERVATION PER HR: HCPCS

## 2024-11-22 PROCEDURE — 37000008 HC ANESTHESIA 1ST 15 MINUTES: Performed by: STUDENT IN AN ORGANIZED HEALTH CARE EDUCATION/TRAINING PROGRAM

## 2024-11-22 PROCEDURE — 43235 EGD DIAGNOSTIC BRUSH WASH: CPT | Performed by: STUDENT IN AN ORGANIZED HEALTH CARE EDUCATION/TRAINING PROGRAM

## 2024-11-22 PROCEDURE — 63600175 PHARM REV CODE 636 W HCPCS: Performed by: NURSE ANESTHETIST, CERTIFIED REGISTERED

## 2024-11-22 PROCEDURE — 85025 COMPLETE CBC W/AUTO DIFF WBC: CPT | Performed by: PHYSICIAN ASSISTANT

## 2024-11-22 PROCEDURE — 37000009 HC ANESTHESIA EA ADD 15 MINS: Performed by: STUDENT IN AN ORGANIZED HEALTH CARE EDUCATION/TRAINING PROGRAM

## 2024-11-22 PROCEDURE — 80061 LIPID PANEL: CPT | Performed by: PHYSICIAN ASSISTANT

## 2024-11-22 RX ORDER — GLUCAGON 1 MG
1 KIT INJECTION
Status: DISCONTINUED | OUTPATIENT
Start: 2024-11-22 | End: 2024-11-22 | Stop reason: HOSPADM

## 2024-11-22 RX ORDER — PROPOFOL 10 MG/ML
VIAL (ML) INTRAVENOUS
Status: DISCONTINUED | OUTPATIENT
Start: 2024-11-22 | End: 2024-11-22

## 2024-11-22 RX ORDER — LIDOCAINE HYDROCHLORIDE 20 MG/ML
INJECTION INTRAVENOUS
Status: DISCONTINUED | OUTPATIENT
Start: 2024-11-22 | End: 2024-11-22

## 2024-11-22 RX ORDER — SODIUM CHLORIDE 0.9 % (FLUSH) 0.9 %
10 SYRINGE (ML) INJECTION
Status: DISCONTINUED | OUTPATIENT
Start: 2024-11-22 | End: 2024-11-22 | Stop reason: HOSPADM

## 2024-11-22 RX ORDER — ONDANSETRON 4 MG/1
8 TABLET, ORALLY DISINTEGRATING ORAL EVERY 8 HOURS PRN
Qty: 9 TABLET | Refills: 0 | Status: SHIPPED | OUTPATIENT
Start: 2024-11-22 | End: 2024-11-25

## 2024-11-22 RX ADMIN — PROPOFOL 200 MCG/KG/MIN: 10 INJECTION, EMULSION INTRAVENOUS at 09:11

## 2024-11-22 RX ADMIN — PROPOFOL 80 MG: 10 INJECTION, EMULSION INTRAVENOUS at 09:11

## 2024-11-22 RX ADMIN — LIDOCAINE HYDROCHLORIDE 100 MG: 20 INJECTION INTRAVENOUS at 09:11

## 2024-11-22 RX ADMIN — GABAPENTIN 300 MG: 300 CAPSULE ORAL at 10:11

## 2024-11-22 RX ADMIN — Medication 400 ML: at 11:11

## 2024-11-22 RX ADMIN — SUCRALFATE 1 G: 1 SUSPENSION ORAL at 11:11

## 2024-11-22 RX ADMIN — ONDANSETRON 4 MG: 2 INJECTION INTRAMUSCULAR; INTRAVENOUS at 09:11

## 2024-11-22 RX ADMIN — MORPHINE SULFATE 4 MG: 4 INJECTION INTRAVENOUS at 02:11

## 2024-11-22 NOTE — TRANSFER OF CARE
"Anesthesia Transfer of Care Note    Patient: Maricruz RODRIGUEZ    Procedure(s) Performed: Procedure(s) (LRB):  EGD (ESOPHAGOGASTRODUODENOSCOPY) (N/A)    Patient location: PACU    Anesthesia Type: general    Transport from OR: Transported from OR on room air with adequate spontaneous ventilation    Post pain: adequate analgesia    Post assessment: no apparent anesthetic complications and tolerated procedure well    Post vital signs: stable    Level of consciousness: awake and alert    Nausea/Vomiting: no nausea/vomiting    Complications: none    Transfer of care protocol was followed      Last vitals: Visit Vitals  BP (!) 87/46 (BP Location: Left arm, Patient Position: Lying)   Pulse 85   Temp 37 °C (98.6 °F) (Temporal)   Resp 17   Ht 5' 1" (1.549 m)   Wt 81.2 kg (179 lb 0.2 oz)   SpO2 96%   Breastfeeding No   BMI 33.82 kg/m²     " negative

## 2024-11-22 NOTE — PROGRESS NOTES
Pt given discharge instructions. IV removed,  applied pressure to site, secured gauze and coban, no redness or swelling noted. Educated on the importance of medication regimen compliance and keeping all scheduled follow up appointments. Pt verbalized understanding to all instructions and education. VSS and NAD at this time.

## 2024-11-22 NOTE — ANESTHESIA POSTPROCEDURE EVALUATION
Anesthesia Post Evaluation    Patient: Maricruz RODRIGUEZ    Procedure(s) Performed: Procedure(s) (LRB):  EGD (ESOPHAGOGASTRODUODENOSCOPY) (N/A)    Final Anesthesia Type: general      Patient location during evaluation: PACU  Patient participation: Yes- Able to Participate  Level of consciousness: awake and alert  Post-procedure vital signs: reviewed and stable  Pain management: adequate  Airway patency: patent  TEETEE mitigation strategies: Multimodal analgesia  PONV status at discharge: No PONV  Anesthetic complications: no      Cardiovascular status: blood pressure returned to baseline, hemodynamically stable and stable  Respiratory status: unassisted, room air and spontaneous ventilation  Hydration status: euvolemic  Follow-up not needed.              Vitals Value Taken Time   BP 90/53 11/22/24 1032   Temp  11/22/24 1041   Pulse 77 11/22/24 1041   Resp 15 11/22/24 1041   SpO2 97 % 11/22/24 1041   Vitals shown include unfiled device data.      No case tracking events are documented in the log.      Pain/Regan Score: Pain Rating Prior to Med Admin: 9 (11/22/2024  2:53 AM)  Pain Rating Post Med Admin: 0 (asleep after taking pain medicine) (11/22/2024  3:23 AM)  Regan Score: 9 (11/22/2024 10:15 AM)

## 2024-11-22 NOTE — TREATMENT PLAN
GI Treatment Plan    S/p EGD today    Impression:            - Normal esophagus.                          - A large amount of food (residue) in the stomach.                          - Normal duodenal bulb.                          - No specimens collected.   Recommendation:        - Return patient to hospital [facility] for ongoing care.                          - Advance diet as tolerated.                          - Continue present medications.                          - Avoid any medications that will slow                          gastrointestinal motility.                          - Do a gastric emptying study at appointment to be                          scheduled outpatient    Gi will sign off    Manuel Reich MD  Gastroenterology Fellow, PGY-IV

## 2024-11-22 NOTE — CARE UPDATE
11/22/2024      Maricruz RODRIGUEZ  [address] [address]          [facility] Medicine Dept.  [facility] [address] [address] [phone] after hours  [phone] fax Maricruz RODRIGUEZ has been hospitalized at the [facility] from 11/20/2024 to 11/22/2024.  Please excuse the patient from duties.  Patient may return on 12/2/2024.  No restrictions.     Please contact me if you have any questions.                  __________________________  Maureen Biggs PA-C  11/22/2024

## 2024-11-22 NOTE — HOSPITAL COURSE
Patient admitted to hospital medicine for CP rule out and intractable n/v. Cardiac work up unremarkable with troponin negative x2 and stress echo without evidence of ischemia. GI consulted, with EGD noted to show food in stomach concerning for slowed digestion. Patient endorses taking Mounjaro; last dose 2 months ago. Tolerating PO intake. Patient stable for discharge for GI follow up. Discussed care plan with patient, verbalized understanding. All questions answered. Return precautions given.

## 2024-11-22 NOTE — ANESTHESIA PREPROCEDURE EVALUATION
[facility]  Anesthesia Pre-Operative Evaluation         Patient Name: Maricruz RODRIGUEZ  YOB: 1970  MRN: [id number]    SUBJECTIVE:     Pre-operative evaluation for Procedure(s) (LRB):  EGD (ESOPHAGOGASTRODUODENOSCOPY) (N/A)     11/21/2024    Maricruz RODRIGUEZ is a 54 y.o. female w/ a significant PMHx of HTN/HLD, obesity, PE after childbirth in her 20's not currently on AC, asthma, cervical cancer s/p hysterectomy, renal cell carcinoma s/p right partial nephrectomy, colitis, kidney stones, insomnia, neuropathy, and tobacco use (quit 3 weeks ago) admitted to hospital medicine for intractable N/V/D    Patient now presents for the above procedure(s).    Stress echo: 11/20/24   Left Ventricle: The left ventricle is normal in size. Normal wall thickness. There is normal systolic function with a visually estimated ejection fraction of 55 - 60%. There is normal diastolic function.    Right Ventricle: Normal right ventricular cavity size. Wall thickness is normal. Systolic function is normal.    IVC/SVC: Normal venous pressure at 3 mmHg.    Stress Protocol: The patient exercised for 6 minutes 29 seconds on a high ramp protocol, corresponding to a functional capacity of 10METS, achieving a peak heart rate of 139 bpm, which is 87% of the age predicted maximum heart rate. Their exercise capacity was average. The patient reported chest tightness 8/10 increased to 10/10 pretest, nausea and light headedness during the stress test. The test was stopped because the patient experienced nausea.    Baseline ECG: The Baseline ECG reveals sinus rhythm. The axis is normal. The ST segments are normal.    Stress ECG: There is no ST segment deviation identified during the protocol. There are no arrhythmias during stress. There is normal blood pressure response with stress.    ECG Conclusion: The ECG portion of the study is negative for ischemia.    Post-stress Echo: The left ventricle systolic function is  hyperdynamic with an EF of 75%. Right ventricle cavity size is small. Right ventricle systolic function is hyperdynamic.    Post-stress Conclusion: The study is negative with no echocardiographic evidence of stress induced ischemia.       LDA:        Peripheral IV - Single Lumen 11/20/24 0741 20 G 1 3/4 in Left Antecubital (Active)   Site Assessment Clean;Dry;Intact;No redness 11/21/24 1200   Line Securement Device Antimicrobial Adhesive 11/20/24 2000   Extremity Assessment Distal to IV No abnormal discoloration;No redness;No swelling;No warmth 11/21/24 0720   Line Status Saline locked 11/21/24 1200   Dressing Status Dry;Intact 11/21/24 1200   Dressing Intervention Integrity maintained 11/21/24 1200   Dressing Change Due 11/24/24 11/21/24 0720   Site Change Due 11/25/24 11/21/24 0720   Reason Not Rotated Not due 11/21/24 0720   Number of days: 1       Prev airway: None documented.    Drips: None documented.    Patient Active Problem List   Diagnosis    ADHD (attention deficit hyperactivity disorder)    Hyperlipidemia    Benign hypertension    Anxiety    Vitamin D deficiency    Chest pain    Intractable nausea and vomiting    Diarrhea       Review of patient's allergies indicates:   Allergen Reactions    Penicillins     Sulfa (sulfonamide antibiotics) Itching       Current Outpatient Medications:    Current Facility-Administered Medications:     acetaminophen tablet 650 mg, 650 mg, Oral, Q4H PRN, Maureen Biggs PA-C    albuterol-ipratropium 2.5 mg-0.5 mg/3 mL nebulizer solution 3 mL, 3 mL, Nebulization, Q4H PRN, Maureen Biggs PA-C    amitriptyline tablet 50 mg, 50 mg, Oral, QHS, Martine Connolly PA-C, 50 mg at 11/20/24 2024    bisacodyL suppository 10 mg, 10 mg, Rectal, Daily PRN, Maureen Biggs PA-C    dextrose 10% bolus 125 mL 125 mL, 12.5 g, Intravenous, PRN, Maureen Biggs PA-C    dextrose 10% bolus 250 mL 250 mL, 25 g, Intravenous, PRN, Maureen Biggs PA-C    dicyclomine injection 20 mg, 20 mg,  Intramuscular, QID, Maureen Biggs PA-C    electrolytes-dextrose (Pedialyte) oral solution 400 mL, 400 mL, Oral, Q4H, Maureen Biggs PA-C, 400 mL at 24 1712    gabapentin capsule 300 mg, 300 mg, Oral, Q12H, Martine Connolly PA-C, 300 mg at 24 0858    glucagon (human recombinant) injection 1 mg, 1 mg, Intramuscular, PRN, Maureen Biggs PA-C    glucose chewable tablet 16 g, 16 g, Oral, PRN, Maureen Biggs PA-C    glucose chewable tablet 24 g, 24 g, Oral, PRN, Maureen Biggs PA-C    melatonin tablet 6 mg, 6 mg, Oral, Nightly PRN, Martine Connolly PA-C    methocarbamoL tablet 500 mg, 500 mg, Oral, TID PRN, Amelia Harris PA-C, 500 mg at 24 0501    morphine injection 4 mg, 4 mg, Intravenous, Q4H PRN, Martine Connolly PA-C, 4 mg at 24 0501    naloxone 0.4 mg/mL injection 0.02 mg, 0.02 mg, Intravenous, PRN, Maureen Biggs PA-C    nicotine 21 mg/24 hr 1 patch, 1 patch, Transdermal, Daily, Maureen Biggs PA-C    nitroGLYCERIN SL tablet 0.4 mg, 0.4 mg, Sublingual, Q5 Min PRN, Martine Connolly PA-C, 0.4 mg at 24 1401    ondansetron injection 4 mg, 4 mg, Intravenous, Q8H PRN, Martine Connolly PA-C, 4 mg at 24 2109    polyethylene glycol packet 17 g, 17 g, Oral, Daily PRN, Maureen Biggs PA-C    prochlorperazine injection Soln 5 mg, 5 mg, Intravenous, Q6H PRN, Amelia Harris PA-C    QUEtiapine tablet 50 mg, 50 mg, Oral, QHS, Martine Connolly PA-C, 50 mg at 24    sodium chloride 0.9% flush 10 mL, 10 mL, Intravenous, PRN, Martine Connolly PA-ZHANE    sodium chloride 0.9% flush 10 mL, 10 mL, Intravenous, Q8H PRN, Maureen Biggs PA-C    sucralfate 100 mg/mL suspension 1 g, 1 g, Oral, Q6H, Amelia Harris PA-C, 1 g at 24 1217    Past Surgical History:   Procedure Laterality Date     SECTION      CHOLECYSTECTOMY      HERNIA REPAIR      HYSTERECTOMY      PARTIAL NEPHRECTOMY Right        Social History     Socioeconomic History     Marital status:    Occupational History    Occupation: Mental health technician/Radiology technician   Tobacco Use    Smoking status: Every Day     Current packs/day: 0.50     Types: Cigarettes    Smokeless tobacco: Never   Substance and Sexual Activity    Alcohol use: No    Drug use: No    Sexual activity: Yes     Partners: Male     Social Drivers of Health      Received from [employer]    Food Insecurity   Transportation Needs: No Transportation Needs (11/20/2024)    TRANSPORTATION NEEDS     Transportation : No   Physical Activity: Unknown (11/20/2024)    Exercise Vital Sign     Days of Exercise per Week: 7 days    Received from [employer]    Housing Stability       OBJECTIVE:     Vital Signs Range (Last 24H):  Temp:  [36.4 °C (97.5 °F)-36.8 °C (98.2 °F)]   Pulse:  [65-83]   Resp:  [18]   BP: ()/(47-66)   SpO2:  [95 %-100 %]       Significant Labs:  Lab Results   Component Value Date    WBC 11.88 11/20/2024    HGB 14.1 11/20/2024    HCT 43.2 11/20/2024     11/20/2024    CHOL 198 03/03/2020    TRIG 158 (H) 03/03/2020    HDL 46 03/03/2020    ALT 23 11/20/2024    AST 19 11/20/2024     (L) 11/20/2024    K 4.3 11/20/2024     11/20/2024    CREATININE 0.9 11/20/2024    BUN 13 11/20/2024    CO2 25 11/20/2024    TSH 1.030 03/03/2020       Diagnostic Studies: No relevant studies.    EKG:   Results for orders placed or performed during the hospital encounter of 11/20/24   EKG 12-lead    Collection Time: 11/20/24  7:17 AM   Result Value Ref Range    QRS Duration 80 ms    [facility] QTC Calculation 463 ms    Narrative    Test Reason : R07.9,    Vent. Rate : 114 BPM     Atrial Rate : 114 BPM     P-R Int : 124 ms          QRS Dur :  80 ms      QT Int : 336 ms       P-R-T Axes :  70  58  91 degrees    QTcB Int : 463 ms    Sinus tachycardia  Otherwise normal ECG  When compared with ECG of 20-Nov-2024 07:03,  No significant change was found  Confirmed by Garry Yao  ([id number]) on 11/20/2024 8:39:08 AM    Referred By:            Confirmed By: Garry Yao       2D ECHO:  TTE:  No results found for this or any previous visit.    GEORGE:  No results found for this or any previous visit.    ASSESSMENT/PLAN:                                                                                                                  11/21/2024  Maricruz RODRIGUEZ is a 54 y.o., female.      Pre-op Assessment    I have reviewed the Patient Summary Reports.     I have reviewed the Nursing Notes. I have reviewed the NPO Status.   I have reviewed the Medications.     Review of Systems  Anesthesia Hx:  No problems with previous Anesthesia   History of prior surgery of interest to airway management or planning:          Denies Family Hx of Anesthesia complications.    Denies Personal Hx of Anesthesia complications.                    Cardiovascular:     Hypertension   Denies MI.  Denies CAD.           hyperlipidemia                               Pulmonary:    Asthma                    Renal/:  Chronic Renal Disease                Psych:  Psychiatric History anxiety                 Physical Exam  General: Well nourished, Cooperative, Alert and Oriented    Airway:  Mallampati: I   Mouth Opening: Normal  TM Distance: Normal  Neck ROM: Normal ROM    Dental:  Intact    Chest/Lungs:  Normal Respiratory Rate    Heart:  Rate: Normal  Rhythm: Regular Rhythm        Anesthesia Plan  Type of Anesthesia, risks & benefits discussed:    Anesthesia Type: Gen Natural Airway, MAC  Intra-op Monitoring Plan: Standard ASA Monitors  Post Op Pain Control Plan: multimodal analgesia  Induction:  IV  Informed Consent: Informed consent signed with the Patient and all parties understand the risks and agree with anesthesia plan.  All questions answered.   ASA Score: 2  Day of Surgery Review of History & Physical: H&P Update referred to the surgeon/provider.    Ready For Surgery From Anesthesia Perspective.     .

## 2024-11-22 NOTE — NURSING TRANSFER
Nursing Transfer Note      11/22/2024   10:57 AM    Transfer To: Room 702    Transfer via stretcher    Transfer with cardiac monitoring    Transported by PCT    Telemetry: Rate 77  Order for Tele Monitor? Yes    4eyes on Skin: yes    Medicines sent: None    Any special needs or follow-up needed: None    Patient belongings transferred with patient: No    Chart send with patient: Yes    Notified: spouse    Patient reassessed at: 11/22/2024  10:50 (date, time)

## 2024-11-22 NOTE — [employer]ATION PATIENT INSTRUCTIONS
Discharge Summary/Instructions after an Endoscopic Procedure  Patient Name: Maricruz Nath  Patient MRN: [id number]  Patient YOB: 1970 Friday, November 22, 2024  Osiel Graves MD  Dear patient,  As a result of recent federal legislation (The Federal Cures Act), you may   receive lab or pathology results from your procedure in your [facility]   account before your physician is able to contact you. Your physician or   their representative will relay the results to you with their   recommendations at their soonest availability.  Thank you,  RESTRICTIONS:  During your procedure today, you received medications for sedation.  These   medications may affect your judgment, balance and coordination.  Therefore,   for 24 hours, you have the following restrictions:   - DO NOT drive a car, operate machinery, make legal/financial decisions,   sign important papers or drink alcohol.    ACTIVITY:  Today: no heavy lifting, straining or running due to procedural   sedation/anesthesia.  The following day: return to full activity including work.  DIET:  Eat and drink normally unless instructed otherwise.     TREATMENT FOR COMMON SIDE EFFECTS:  - Mild abdominal pain, nausea, belching, bloating or excessive gas:  rest,   eat lightly and use a heating pad.  - Sore Throat: treat with throat lozenges and/or gargle with warm salt   water.  - Because air was used during the procedure, expelling large amounts of air   from your rectum or belching is normal.  - If a bowel prep was taken, you may not have a bowel movement for 1-3 days.    This is normal.  SYMPTOMS TO WATCH FOR AND REPORT TO YOUR PHYSICIAN:  1. Abdominal pain or bloating, other than gas cramps.  2. Chest pain.  3. Back pain.  4. Signs of infection such as: chills or fever occurring within 24 hours   after the procedure.  5. Rectal bleeding, which would show as bright red, maroon, or black stools.   (A tablespoon of blood from the rectum is not serious, especially  if   hemorrhoids are present.)  6. Vomiting.  7. Weakness or dizziness.  GO DIRECTLY TO THE NEAREST EMERGENCY ROOM IF YOU HAVE ANY OF THE FOLLOWING:      Difficulty breathing              Chills and/or fever over 101 F   Persistent vomiting and/or vomiting blood   Severe abdominal pain   Severe chest pain   Black, tarry stools   Bleeding- more than one tablespoon   Any other symptom or condition that you feel may need urgent attention  Your doctor recommends these additional instructions:  If any biopsies were taken, your doctors clinic will contact you in 1 to 2   weeks with any results.  - Return patient to hospital [facility] for ongoing care.   - Advance diet as tolerated.   - Continue present medications.   - Avoid any medications that will slow gastrointestinal motility.   - Do a gastric emptying study at appointment to be scheduled.  For questions, problems or results please call your physician - Osiel Graves MD at Work:  [phone].  [facility], EMERGENCY ROOM PHONE NUMBER: [phone]  IF A COMPLICATION OR EMERGENCY SITUATION ARISES AND YOU ARE UNABLE TO REACH   YOUR PHYSICIAN - GO DIRECTLY TO THE EMERGENCY ROOM.  Osiel Graves MD  11/22/2024 10:20:26 AM  This report has been verified and signed electronically.  Dear patient,  As a result of recent federal legislation (The Federal Cures Act), you may   receive lab or pathology results from your procedure in your [facility]   account before your physician is able to contact you. Your physician or   their representative will relay the results to you with their   recommendations at their soonest availability.  Thank you,  [employer]ATION

## 2024-11-22 NOTE — PLAN OF CARE
[facility]  Discharge Final Note    Primary Care Provider: Mario Saldana MD (Inactive)    Expected Discharge Date: 11/22/2024    Patient discharged to home via personal transportation.     Patient's bedside nurse and patient notified of the above.    Discharge Plan A and Plan B have been determined by review of patient's clinical status, future medical and therapeutic needs, and coverage/benefits for post-acute care in coordination with multidisciplinary team members.        Final Discharge Note (most recent)       Final Note - 11/22/24 1456          Final Note    Assessment Type Final Discharge Note (P)      Anticipated Discharge Disposition Home or Self Care (P)         Post-Acute Status    Post-Acute Authorization Other (P)      Other Status No Post-Acute Service Needs (P)                      Important Message from Medicare             Contact Info       Mario Saldana MD   Specialty: Family Medicine   Relationship: PCP - General    [address] [place] [address]  [facility] [place] [address]   Phone: [phone]       Next Steps: Follow up in 1 week(s)    Mario Saldana MD   Specialty: Family Medicine   Relationship: PCP - General    [address] [place] [address]  [facility] [place] [address]   Phone: [phone]       Next Steps: Follow up    Instructions: Pt's primary care office is closed. CHW will f/u.    [facility]   Specialty: Gastroenterology    [address]  [facility] [address]   Phone: [phone]       Next Steps: Follow up    Instructions: Message sent for nurse to call and schedule follow up appointment; however, if you do not hear from someone within 48 hours contact the number listed.            No future appointments.    SW scheduled post-discharge follow-up appointment and information added to AVS.     Destini Gottlieb LMSW  [facility]  Ext. [phone]

## 2024-11-24 NOTE — ASSESSMENT & PLAN NOTE
- Acute, patient reports chest pain is ongoing  - EKG without ST-segment elevation or depression  - CXR unremarkable  - troponin negative x 2  - ASA administered in the ED.  - Stress echo below      Left Ventricle: The left ventricle is normal in size. Normal wall thickness. There is normal systolic function with a visually estimated ejection fraction of 55 - 60%. There is normal diastolic function.    Right Ventricle: Normal right ventricular cavity size. Wall thickness is normal. Systolic function is normal.    IVC/SVC: Normal venous pressure at 3 mmHg.    Stress Protocol: The patient exercised for 6 minutes 29 seconds on a high ramp protocol, corresponding to a functional capacity of 10METS, achieving a peak heart rate of 139 bpm, which is 87% of the age predicted maximum heart rate. Their exercise capacity was average. The patient reported chest tightness 8/10 increased to 10/10 pretest, nausea and light headedness during the stress test. The test was stopped because the patient experienced nausea.    Baseline ECG: The Baseline ECG reveals sinus rhythm. The axis is normal. The ST segments are normal.    Stress ECG: There is no ST segment deviation identified during the protocol. There are no arrhythmias during stress. There is normal blood pressure response with stress.    ECG Conclusion: The ECG portion of the study is negative for ischemia.    Post-stress Echo: The left ventricle systolic function is hyperdynamic with an EF of 75%. Right ventricle cavity size is small. Right ventricle systolic function is hyperdynamic.    Post-stress Conclusion: The study is negative with no echocardiographic evidence of stress induced ischemia.

## 2024-11-24 NOTE — DISCHARGE SUMMARY
[facility] [address] [place] [address] Medicine  Discharge Summary      Patient Name: Maricruz RODRIGUEZ  MRN: [id number]  AFIA: [id number]  Patient Class: OP- Observation  Admission Date: 11/20/2024  Hospital Length of Stay: 0 days  Discharge Date and Time: 11/22/2024  2:38 PM  Attending Physician: Lelia att. providers found   Discharging Provider: Maureen Biggs PA-C  Primary Care Provider: Mario Saldana MD (Inactive)  Hospital Medicine Team: [facility] [place] [facility] HOSP MED E Maureen Biggs PA-C  Primary Care Team: [facility] HOSP MED E    HPI:   The patient is a 54 year old female with significant PMHx significant for HTN/HLD (stopped meds after weight loss through diet), obesity, PE after childbirth in her 20's not currently on AC, asthma, cervical cancer s/p hysterectomy, renal cell carcinoma s/p right partial nephrectomy, colitis, kidney stones, insomnia, neuropathy, and tobacco use (quit 3 weeks ago) admitted to hospital medicine for intractable N/V/D with associated chest discomfort for the past 3 days. She describes the chest pain as a constant, heaviness/pressure that radiates to her back.  States that she recently moved to [place] from [place] with her , and that on Sunday she was hooking up a gas clothes dryer in her new place and began smelling gas fumes. She states that shortly after is when her symptoms began. Reports she presented to the ED as her chest pressure was worsening as the n/v persisted. Denies fever/chills, diaphoresis, lightheadedness, HA, SOB, cough, congestion, urinary symptoms, changes in BMs, numbness/tingling, weakness. Of note, she reports multiple ER visits for chest pain while living in [place] in the past, but states that she has never had a stress test in the past. States that her mother had a triple bypass at age 50, and her father had a heart attack in his 70s.     Patient was admitted to the EDOU for which work up showed negative trop x2. BNP WNL. D dimer slightly elevated. UA noninfectious.  Carboxyhemoglobin WNL. CTA negative for PE. CXR unremarkable. Stress echo negative for ischemia. Given MM pain regimen, GI cocktail, and antiemetics without significant improvement in symptoms. Patient still unable to tolerate PO intake. GI consulted, plan for EGD tomorrow.     Procedure(s) (LRB):  EGD (ESOPHAGOGASTRODUODENOSCOPY) (N/A)      Hospital Course:   Patient admitted to hospital medicine for CP rule out and intractable n/v. Cardiac work up unremarkable with troponin negative x2 and stress echo without evidence of ischemia. GI consulted, with EGD noted to show food in stomach concerning for slowed digestion. Patient endorses taking Mounjaro; last dose 2 months ago. Tolerating PO intake. Patient stable for discharge for GI follow up. Discussed care plan with patient, verbalized understanding. All questions answered. Return precautions given.       Goals of Care Treatment Preferences:  Code Status: Full Code      SDOH Screening:  The patient declined to be screened for utility difficulties, food insecurity, transport difficulties, housing insecurity, and interpersonal safety, so no concerns could be identified this admission.     Consults:   Consults (From admission, onward)          Status Ordering Provider     Inpatient consult to Gastroenterology  Once        Provider:  (Not yet assigned)    MONE Tran            Cardiac/Vascular  Chest pain  - Acute, patient reports chest pain is ongoing  - EKG without ST-segment elevation or depression  - CXR unremarkable  - troponin negative x 2  - ASA administered in the ED.  - Stress echo below      Left Ventricle: The left ventricle is normal in size. Normal wall thickness. There is normal systolic function with a visually estimated ejection fraction of 55 - 60%. There is normal diastolic function.    Right Ventricle: Normal right ventricular cavity size. Wall thickness is normal. Systolic function is normal.    IVC/SVC: Normal venous pressure at 3  mmHg.    Stress Protocol: The patient exercised for 6 minutes 29 seconds on a high ramp protocol, corresponding to a functional capacity of 10METS, achieving a peak heart rate of 139 bpm, which is 87% of the age predicted maximum heart rate. Their exercise capacity was average. The patient reported chest tightness 8/10 increased to 10/10 pretest, nausea and light headedness during the stress test. The test was stopped because the patient experienced nausea.    Baseline ECG: The Baseline ECG reveals sinus rhythm. The axis is normal. The ST segments are normal.    Stress ECG: There is no ST segment deviation identified during the protocol. There are no arrhythmias during stress. There is normal blood pressure response with stress.    ECG Conclusion: The ECG portion of the study is negative for ischemia.    Post-stress Echo: The left ventricle systolic function is hyperdynamic with an EF of 75%. Right ventricle cavity size is small. Right ventricle systolic function is hyperdynamic.    Post-stress Conclusion: The study is negative with no echocardiographic evidence of stress induced ischemia.        Benign hypertension  Patients blood pressure range in the last 24 hours was: No data recorded.The patient's inpatient anti-hypertensive regimen is listed below:         Plan  - BP is controlled, no changes needed to their regimen    Hyperlipidemia  - not on a statin at home  - lipid panel WNL      GI  * Intractable nausea and vomiting  Diarrhea  - reports n/v/d for the past 3 days  - VSS  - labs largely unremarkable including carboxyhemoglobin   - CP work up without signs of ischemia  - give 1L IVF in the ED  - started on bentyl, sucralfate, prn antiemetics  - stool studies pending  - GI consulted, report There is no evidence of bloody stool or emesis to indicate Daniela-Stanford, and chest x-ray without findings concerning for esophageal perforation. Given the patient's inability to tolerate po intake and persistent vomiting,  we will perform EGD  - EGD with food in stomach concerning for slowed digestion  - stable for discharge with GI follow up        Final Active Diagnoses:    Diagnosis Date Noted POA    PRINCIPAL PROBLEM:  Intractable nausea and vomiting [R11.2] 11/21/2024 Yes    Diarrhea [R19.7] 11/21/2024 Yes    Chest pain [R07.9] 11/20/2024 Yes    Vitamin D deficiency [E55.9] 03/03/2020 Yes    Hyperlipidemia [E78.5] 12/06/2019 Yes    Benign hypertension [I10] 12/06/2019 Yes      Problems Resolved During this Admission:       Discharged Condition: stable    Disposition: Home or Self Care    Follow Up:   Follow-up Information       Mario Saldana MD Follow up in 1 week(s).    Specialty: Family Medicine  Contact information:  [address] [facility] [place] [place]  [place] [address]  [phone]               Mario Saldana MD Follow up.    Specialty: Family Medicine  Why: Pt's primary care office is closed. [facility] will f/u.  Contact information:  [address] [facility] [place] [place]  [place] [address]  [phone]               [facility] 4th Fl Follow up.    Specialty: Gastroenterology  Why: Message sent for nurse to call and schedule follow up appointment; however, if you do not hear from someone within 48 hours contact the number listed.  Contact information:  [facility]  [facility] [address] [phone]  Additional information:  [facility] - Main Building, 4th Floor   Please park in [facility] and take At[facility] elevator                         Patient Instructions:      Ambulatory referral/consult to Gastroenterology   Standing Status: Future   Referral Priority: Routine Referral Type: Consultation   Referral Reason: Specialty Services Required   Requested Specialty: Gastroenterology   Number of Visits Requested: 1     Diet Adult Regular     Notify your health care provider if you experience any of the following:  severe uncontrolled pain     Notify your health care  provider if you experience any of the following:  persistent nausea and vomiting or diarrhea     Notify your health care provider if you experience any of the following:  temperature >100.4     Activity as tolerated       Significant Diagnostic Studies: Labs: All labs within the past 24 hours have been reviewed    Pending Diagnostic Studies:       None           Medications:  Reconciled Home Medications:      Medication List        START taking these medications      ondansetron 4 MG Tbdl  Commonly known as: ZOFRAN-ODT  DISSOLVE 2 tablets (8 mg total) by mouth every 8 (eight) hours as needed (nausea).            CONTINUE taking these medications      amitriptyline 50 MG tablet  Commonly known as: ELAVIL  Take 50 mg by mouth every evening.     gabapentin 300 MG capsule  Commonly known as: NEURONTIN  Take 300 mg by mouth every 12 (twelve) hours.     multivitamin per tablet  Commonly known as: THERAGRAN  Take 1 tablet by mouth every morning.     nicotine 21 mg/24 hr  Commonly known as: NICODERM CQ  Place 1 patch onto the skin once daily.     QUEtiapine 50 MG tablet  Commonly known as: SEROQUEL  Take 50 mg by mouth every evening.     spironolactone 100 MG tablet  Commonly known as: ALDACTONE  TAKE 1 TABLET BY MOUTH EVERY DAY              Indwelling Lines/Drains at time of discharge:   Lines/Drains/Airways       None                   Time spent on the discharge of patient: 35 minutes of the time sent on discharge, including examining the patient, providing discharge instructions, arranging follow up, and documentation           Maureen Biggs PA-C  Department of Hospital Medicine  [facility]H

## 2024-11-24 NOTE — ASSESSMENT & PLAN NOTE
Diarrhea  - reports n/v/d for the past 3 days  - VSS  - labs largely unremarkable including carboxyhemoglobin   - CP work up without signs of ischemia  - give 1L IVF in the ED  - started on bentyl, sucralfate, prn antiemetics  - stool studies pending  - GI consulted, report There is no evidence of bloody stool or emesis to indicate Daniela-Stanford, and chest x-ray without findings concerning for esophageal perforation. Given the patient's inability to tolerate po intake and persistent vomiting, we will perform EGD  - EGD with food in stomach concerning for slowed digestion  - stable for discharge with GI follow up

## 2024-11-24 NOTE — ASSESSMENT & PLAN NOTE
Patients blood pressure range in the last 24 hours was: No data recorded.The patient's inpatient anti-hypertensive regimen is listed below:         Plan  - BP is controlled, no changes needed to their regimen

## 2024-11-26 ENCOUNTER — TELEPHONE (OUTPATIENT)
Dept: GASTROENTEROLOGY | Facility: CLINIC | Age: 54
End: 2024-11-26
Payer: COMMERCIAL

## 2024-11-26 NOTE — TELEPHONE ENCOUNTER
----- Message from Lynn sent at 11/22/2024  2:54 PM CST -----  Regarding: Hosptial f/u  Contact: [phone]  MARIUM RODRIGUEZ calling regarding Hospital Follow Up (message) for # [facility]  is calling to schedule a hospital follow up for pt before 11/29 pls advise

## 2024-11-26 NOTE — TELEPHONE ENCOUNTER
MA called/LVM for patient letting her now that due to her insurance I am unable to scheduled an appointment with our clinic. If any questions to call the office. Number provided.    [facility] Exchange plan-insurance not accepted at this location.  Patient does have a secondary insurance, [facility].

## 2025-08-20 ENCOUNTER — HOSPITAL ENCOUNTER (INPATIENT)
Facility: HOSPITAL | Age: 55
LOS: 2 days | Discharge: HOME | DRG: 287 | End: 2025-08-22
Attending: EMERGENCY MEDICINE | Admitting: EMERGENCY MEDICINE
Payer: COMMERCIAL

## 2025-08-20 DIAGNOSIS — R07.2 PRECORDIAL PAIN: ICD-10-CM

## 2025-08-20 DIAGNOSIS — E66.9 OBESITY, UNSPECIFIED CLASS, UNSPECIFIED OBESITY TYPE, UNSPECIFIED WHETHER SERIOUS COMORBIDITY PRESENT: ICD-10-CM

## 2025-08-20 DIAGNOSIS — R07.9 CHEST PAIN: ICD-10-CM

## 2025-08-20 DIAGNOSIS — R07.89 OTHER CHEST PAIN: Primary | ICD-10-CM

## 2025-08-20 DIAGNOSIS — R10.9 ABDOMINAL PAIN, UNSPECIFIED ABDOMINAL LOCATION: ICD-10-CM

## 2025-08-20 PROBLEM — Z90.5 HX OF PARTIAL NEPHRECTOMY: Status: ACTIVE | Noted: 2025-08-20

## 2025-08-20 PROBLEM — C64.9 RENAL CELL CARCINOMA: Status: ACTIVE | Noted: 2025-08-20

## 2025-08-20 PROBLEM — Z85.41 HISTORY OF CERVICAL CANCER: Status: ACTIVE | Noted: 2025-08-20

## 2025-08-20 LAB
ABO GROUP BLD: NORMAL
ABSOLUTE EOSINOPHIL (OHS): 0.2 K/UL
ABSOLUTE MONOCYTE (OHS): 0.53 K/UL (ref 0.3–1)
ABSOLUTE NEUTROPHIL COUNT (OHS): 5.67 K/UL (ref 1.8–7.7)
ALBUMIN SERPL BCP-MCNC: 3.4 G/DL (ref 3.5–5.2)
ALP SERPL-CCNC: 100 UNIT/L (ref 40–150)
ALT SERPL W/O P-5'-P-CCNC: 61 UNIT/L (ref 10–44)
ANION GAP (OHS): 11 MMOL/L (ref 8–16)
APTT PPP: 109.6 SECONDS (ref 21–32)
APTT PPP: 61 SECONDS (ref 21–32)
AST SERPL-CCNC: 45 UNIT/L (ref 11–45)
AV INDEX (PROSTH): 0.88
AV MEAN GRADIENT: 4 MMHG
AV PEAK GRADIENT: 7 MMHG
AV VALVE AREA BY VELOCITY RATIO: 2.4 CM²
AV VALVE AREA: 2.8 CM²
AV VELOCITY RATIO: 0.77
BASOPHILS # BLD AUTO: 0.03 K/UL
BASOPHILS NFR BLD AUTO: 0.4 %
BILIRUB SERPL-MCNC: 0.2 MG/DL (ref 0.1–1)
BILIRUB UR QL STRIP.AUTO: NEGATIVE
BUN SERPL-MCNC: 13 MG/DL (ref 6–20)
CALCIUM SERPL-MCNC: 9.3 MG/DL (ref 8.7–10.5)
CHLORIDE SERPL-SCNC: 106 MMOL/L (ref 95–110)
CHOLEST SERPL-MCNC: 209 MG/DL (ref 120–199)
CHOLEST/HDLC SERPL: 4.4 {RATIO} (ref 2–5)
CLARITY UR: CLEAR
CO2 SERPL-SCNC: 22 MMOL/L (ref 23–29)
COLOR UR AUTO: YELLOW
CREAT SERPL-MCNC: 0.6 MG/DL (ref 0.5–1.4)
CV ECHO LV RWT: 0.56 CM
D DIMER PPP IA.FEU-MCNC: 0.63 MG/L FEU
DOP CALC AO PEAK VEL: 1.3 M/S
DOP CALC AO VTI: 27.7 CM
DOP CALC LVOT AREA: 3.1 CM2
DOP CALC LVOT DIAMETER: 2 CM
DOP CALC LVOT PEAK VEL: 1 M/S
DOP CALC MV VTI: 23 CM
DOP CALCLVOT PEAK VEL VTI: 24.4 CM
E WAVE DECELERATION TIME: 130 MSEC
E/A RATIO: 1.05
E/E' RATIO: 9 M/S
ECHO LV POSTERIOR WALL: 1 CM (ref 0.6–1.1)
ERYTHROCYTE [DISTWIDTH] IN BLOOD BY AUTOMATED COUNT: 13.8 % (ref 11.5–14.5)
FRACTIONAL SHORTENING: 27.8 % (ref 28–44)
GFR SERPLBLD CREATININE-BSD FMLA CKD-EPI: >60 ML/MIN/1.73/M2
GLUCOSE SERPL-MCNC: 99 MG/DL (ref 70–110)
GLUCOSE UR QL STRIP: NEGATIVE
HCT VFR BLD AUTO: 37.1 % (ref 37–48.5)
HDLC SERPL-MCNC: 47 MG/DL (ref 40–75)
HDLC SERPL: 22.5 % (ref 20–50)
HGB BLD-MCNC: 11.5 GM/DL (ref 12–16)
HGB UR QL STRIP: ABNORMAL
HOLD SPECIMEN: NORMAL
IMM GRANULOCYTES # BLD AUTO: 0.04 K/UL (ref 0–0.04)
IMM GRANULOCYTES NFR BLD AUTO: 0.5 % (ref 0–0.5)
INDIRECT COOMBS: NORMAL
INR PPP: 0.9 (ref 0.8–1.2)
INTERVENTRICULAR SEPTUM: 1.1 CM (ref 0.6–1.1)
IVC DIAMETER: 1.89 CM
KETONES UR QL STRIP: NEGATIVE
LA MAJOR: 5.3 CM
LA MINOR: 5.7 CM
LA WIDTH: 3.9 CM
LDLC SERPL CALC-MCNC: 134 MG/DL (ref 63–159)
LEFT ATRIUM SIZE: 3.3 CM
LEFT ATRIUM VOLUME: 60 CM3
LEFT INTERNAL DIMENSION IN SYSTOLE: 2.6 CM (ref 2.1–4)
LEFT VENTRICLE DIASTOLIC VOLUME: 55 ML
LEFT VENTRICLE SYSTOLIC VOLUME: 25 ML
LEFT VENTRICULAR INTERNAL DIMENSION IN DIASTOLE: 3.6 CM (ref 3.5–6)
LEFT VENTRICULAR MASS: 115.9 G
LEUKOCYTE ESTERASE UR QL STRIP: NEGATIVE
LV LATERAL E/E' RATIO: 8 M/S
LV SEPTAL E/E' RATIO: 9.6 M/S
LVED V (TEICH): 54.71 ML
LVES V (TEICH): 25.13 ML
LVOT MG: 2.06 MMHG
LVOT MV: 0.67 CM/S
LYMPHOCYTES # BLD AUTO: 2.02 K/UL (ref 1–4.8)
MCH RBC QN AUTO: 30.3 PG (ref 27–31)
MCHC RBC AUTO-ENTMCNC: 31 G/DL (ref 32–36)
MCV RBC AUTO: 98 FL (ref 82–98)
MV MEAN GRADIENT: 3 MMHG
MV PEAK A VEL: 0.91 M/S
MV PEAK E VEL: 0.96 M/S
MV PEAK GRADIENT: 5 MMHG
MV STENOSIS PRESSURE HALF TIME: 37.58 MS
MV VALVE AREA BY CONTINUITY EQUATION: 3.33 CM2
MV VALVE AREA P 1/2 METHOD: 5.85 CM2
NITRITE UR QL STRIP: NEGATIVE
NONHDLC SERPL-MCNC: 162 MG/DL
NT-PROBNP SERPL-MCNC: 103 PG/ML
NUCLEATED RBC (/100WBC) (OHS): 0 /100 WBC
OHS CV RV/LV RATIO: 0.83 CM
OHS QRS DURATION: 86 MS
OHS QTC CALCULATION: 454 MS
PH UR STRIP: 6 [PH]
PLATELET # BLD AUTO: 261 K/UL (ref 150–450)
PMV BLD AUTO: 11.4 FL (ref 9.2–12.9)
POTASSIUM SERPL-SCNC: 4.3 MMOL/L (ref 3.5–5.1)
PROT SERPL-MCNC: 6.7 GM/DL (ref 6–8.4)
PROT UR QL STRIP: NEGATIVE
PROTHROMBIN TIME: 10.7 SECONDS (ref 9–12.5)
PULM VEIN S/D RATIO: 1.93
PV PEAK D VEL: 0.41 M/S
PV PEAK GRADIENT: 4 MMHG
PV PEAK S VEL: 0.79 M/S
PV PEAK VELOCITY: 1.05 M/S
RA MAJOR: 3.89 CM
RA PRESSURE ESTIMATED: 8 MMHG
RA WIDTH: 3.5 CM
RBC # BLD AUTO: 3.8 M/UL (ref 4–5.4)
RELATIVE EOSINOPHIL (OHS): 2.4 %
RELATIVE LYMPHOCYTE (OHS): 23.8 % (ref 18–48)
RELATIVE MONOCYTE (OHS): 6.2 % (ref 4–15)
RELATIVE NEUTROPHIL (OHS): 66.7 % (ref 38–73)
RH BLD: NORMAL
RH BLD: NORMAL
RIGHT VENTRICLE DIASTOLIC BASEL DIMENSION: 3 CM
RIGHT VENTRICULAR END-DIASTOLIC DIMENSION: 2.95 CM
RV TISSUE DOPPLER FREE WALL SYSTOLIC VELOCITY 1 (APICAL 4 CHAMBER VIEW): 13.79 CM/S
SINUS: 3 CM
SODIUM SERPL-SCNC: 139 MMOL/L (ref 136–145)
SP GR UR STRIP: 1.02
SPECIMEN OUTDATE: NORMAL
STJ: 3 CM
TDI LATERAL: 0.12 M/S
TDI SEPTAL: 0.1 M/S
TDI: 0.11 M/S
TRICUSPID ANNULAR PLANE SYSTOLIC EXCURSION: 2.4 CM
TRIGL SERPL-MCNC: 140 MG/DL (ref 30–150)
TROPONIN I SERPL HS-MCNC: <3 NG/L
UROBILINOGEN UR STRIP-ACNC: NEGATIVE EU/DL
WBC # BLD AUTO: 8.49 K/UL (ref 3.9–12.7)

## 2025-08-20 PROCEDURE — 25500020 PHARM REV CODE 255: Performed by: EMERGENCY MEDICINE

## 2025-08-20 PROCEDURE — 63600175 PHARM REV CODE 636 W HCPCS: Mod: JZ,TB | Performed by: INTERNAL MEDICINE

## 2025-08-20 PROCEDURE — 96375 TX/PRO/DX INJ NEW DRUG ADDON: CPT

## 2025-08-20 PROCEDURE — 99285 EMERGENCY DEPT VISIT HI MDM: CPT | Mod: 25

## 2025-08-20 PROCEDURE — 96376 TX/PRO/DX INJ SAME DRUG ADON: CPT

## 2025-08-20 PROCEDURE — 63600175 PHARM REV CODE 636 W HCPCS: Performed by: NURSE PRACTITIONER

## 2025-08-20 PROCEDURE — 93005 ELECTROCARDIOGRAM TRACING: CPT

## 2025-08-20 PROCEDURE — 93010 ELECTROCARDIOGRAM REPORT: CPT | Mod: ,,, | Performed by: INTERNAL MEDICINE

## 2025-08-20 PROCEDURE — 85730 THROMBOPLASTIN TIME PARTIAL: CPT | Performed by: EMERGENCY MEDICINE

## 2025-08-20 PROCEDURE — 99223 1ST HOSP IP/OBS HIGH 75: CPT | Mod: 25,,, | Performed by: INTERNAL MEDICINE

## 2025-08-20 PROCEDURE — 11000001 HC ACUTE MED/SURG PRIVATE ROOM

## 2025-08-20 PROCEDURE — 85610 PROTHROMBIN TIME: CPT | Performed by: EMERGENCY MEDICINE

## 2025-08-20 PROCEDURE — 80061 LIPID PANEL: CPT | Performed by: NURSE PRACTITIONER

## 2025-08-20 PROCEDURE — 85730 THROMBOPLASTIN TIME PARTIAL: CPT | Performed by: STUDENT IN AN ORGANIZED HEALTH CARE EDUCATION/TRAINING PROGRAM

## 2025-08-20 PROCEDURE — 86850 RBC ANTIBODY SCREEN: CPT | Performed by: NURSE PRACTITIONER

## 2025-08-20 PROCEDURE — 96374 THER/PROPH/DIAG INJ IV PUSH: CPT

## 2025-08-20 PROCEDURE — 63600175 PHARM REV CODE 636 W HCPCS: Mod: JZ,TB | Performed by: EMERGENCY MEDICINE

## 2025-08-20 PROCEDURE — 25000003 PHARM REV CODE 250: Performed by: NURSE PRACTITIONER

## 2025-08-20 PROCEDURE — 36415 COLL VENOUS BLD VENIPUNCTURE: CPT | Performed by: STUDENT IN AN ORGANIZED HEALTH CARE EDUCATION/TRAINING PROGRAM

## 2025-08-20 PROCEDURE — 83880 ASSAY OF NATRIURETIC PEPTIDE: CPT | Performed by: EMERGENCY MEDICINE

## 2025-08-20 PROCEDURE — 86901 BLOOD TYPING SEROLOGIC RH(D): CPT | Performed by: NURSE PRACTITIONER

## 2025-08-20 PROCEDURE — 85379 FIBRIN DEGRADATION QUANT: CPT | Performed by: EMERGENCY MEDICINE

## 2025-08-20 PROCEDURE — 84484 ASSAY OF TROPONIN QUANT: CPT | Performed by: NURSE PRACTITIONER

## 2025-08-20 PROCEDURE — 86900 BLOOD TYPING SEROLOGIC ABO: CPT | Performed by: NURSE PRACTITIONER

## 2025-08-20 PROCEDURE — 36415 COLL VENOUS BLD VENIPUNCTURE: CPT | Performed by: NURSE PRACTITIONER

## 2025-08-20 PROCEDURE — 84484 ASSAY OF TROPONIN QUANT: CPT | Performed by: EMERGENCY MEDICINE

## 2025-08-20 PROCEDURE — 25000003 PHARM REV CODE 250: Performed by: EMERGENCY MEDICINE

## 2025-08-20 PROCEDURE — 80053 COMPREHEN METABOLIC PANEL: CPT | Performed by: EMERGENCY MEDICINE

## 2025-08-20 PROCEDURE — 36415 COLL VENOUS BLD VENIPUNCTURE: CPT | Performed by: EMERGENCY MEDICINE

## 2025-08-20 PROCEDURE — 81003 URINALYSIS AUTO W/O SCOPE: CPT | Performed by: EMERGENCY MEDICINE

## 2025-08-20 PROCEDURE — 85025 COMPLETE CBC W/AUTO DIFF WBC: CPT | Performed by: EMERGENCY MEDICINE

## 2025-08-20 RX ORDER — ALUMINUM HYDROXIDE, MAGNESIUM HYDROXIDE, AND SIMETHICONE 1200; 120; 1200 MG/30ML; MG/30ML; MG/30ML
30 SUSPENSION ORAL ONCE
Status: COMPLETED | OUTPATIENT
Start: 2025-08-20 | End: 2025-08-20

## 2025-08-20 RX ORDER — SODIUM CHLORIDE 0.9 % (FLUSH) 0.9 %
10 SYRINGE (ML) INJECTION
Status: DISCONTINUED | OUTPATIENT
Start: 2025-08-20 | End: 2025-08-22 | Stop reason: HOSPADM

## 2025-08-20 RX ORDER — MORPHINE SULFATE 4 MG/ML
2 INJECTION, SOLUTION INTRAMUSCULAR; INTRAVENOUS EVERY 4 HOURS PRN
Refills: 0 | Status: DISCONTINUED | OUTPATIENT
Start: 2025-08-20 | End: 2025-08-21

## 2025-08-20 RX ORDER — QUETIAPINE FUMARATE 100 MG/1
100 TABLET, FILM COATED ORAL DAILY
COMMUNITY
Start: 2025-08-11

## 2025-08-20 RX ORDER — MORPHINE SULFATE 4 MG/ML
2 INJECTION, SOLUTION INTRAMUSCULAR; INTRAVENOUS
Refills: 0 | Status: COMPLETED | OUTPATIENT
Start: 2025-08-20 | End: 2025-08-20

## 2025-08-20 RX ORDER — SPIRONOLACTONE 25 MG/1
100 TABLET ORAL DAILY
Status: DISCONTINUED | OUTPATIENT
Start: 2025-08-21 | End: 2025-08-22 | Stop reason: HOSPADM

## 2025-08-20 RX ORDER — QUETIAPINE FUMARATE 100 MG/1
100 TABLET, FILM COATED ORAL DAILY
Status: DISCONTINUED | OUTPATIENT
Start: 2025-08-21 | End: 2025-08-20

## 2025-08-20 RX ORDER — LEVOCETIRIZINE DIHYDROCHLORIDE 5 MG/1
5 TABLET, FILM COATED ORAL NIGHTLY
COMMUNITY
End: 2025-08-20

## 2025-08-20 RX ORDER — KETOROLAC TROMETHAMINE 30 MG/ML
30 INJECTION, SOLUTION INTRAMUSCULAR; INTRAVENOUS ONCE
Status: COMPLETED | OUTPATIENT
Start: 2025-08-20 | End: 2025-08-20

## 2025-08-20 RX ORDER — LIDOCAINE HYDROCHLORIDE 20 MG/ML
15 SOLUTION OROPHARYNGEAL ONCE
Status: COMPLETED | OUTPATIENT
Start: 2025-08-20 | End: 2025-08-20

## 2025-08-20 RX ORDER — HEPARIN SODIUM,PORCINE/D5W 25000/250
0-40 INTRAVENOUS SOLUTION INTRAVENOUS CONTINUOUS
Status: DISCONTINUED | OUTPATIENT
Start: 2025-08-20 | End: 2025-08-21

## 2025-08-20 RX ORDER — ACETAMINOPHEN 325 MG/1
650 TABLET ORAL EVERY 6 HOURS PRN
Status: DISCONTINUED | OUTPATIENT
Start: 2025-08-20 | End: 2025-08-22 | Stop reason: HOSPADM

## 2025-08-20 RX ORDER — ASPIRIN 325 MG
325 TABLET ORAL
Status: COMPLETED | OUTPATIENT
Start: 2025-08-20 | End: 2025-08-20

## 2025-08-20 RX ORDER — ATORVASTATIN CALCIUM 40 MG/1
40 TABLET, FILM COATED ORAL NIGHTLY
Status: DISCONTINUED | OUTPATIENT
Start: 2025-08-20 | End: 2025-08-22 | Stop reason: HOSPADM

## 2025-08-20 RX ORDER — CLOPIDOGREL BISULFATE 75 MG/1
75 TABLET ORAL DAILY
Status: DISCONTINUED | OUTPATIENT
Start: 2025-08-21 | End: 2025-08-21

## 2025-08-20 RX ORDER — AMITRIPTYLINE HYDROCHLORIDE 25 MG/1
50 TABLET, FILM COATED ORAL NIGHTLY
Status: DISCONTINUED | OUTPATIENT
Start: 2025-08-20 | End: 2025-08-22 | Stop reason: HOSPADM

## 2025-08-20 RX ORDER — OMEPRAZOLE 40 MG/1
40 CAPSULE, DELAYED RELEASE ORAL EVERY MORNING
COMMUNITY
Start: 2025-05-29

## 2025-08-20 RX ORDER — ONDANSETRON HYDROCHLORIDE 2 MG/ML
4 INJECTION, SOLUTION INTRAVENOUS EVERY 6 HOURS PRN
Status: DISCONTINUED | OUTPATIENT
Start: 2025-08-20 | End: 2025-08-21

## 2025-08-20 RX ORDER — ATORVASTATIN CALCIUM 40 MG/1
40 TABLET, FILM COATED ORAL NIGHTLY
COMMUNITY
Start: 2025-06-03

## 2025-08-20 RX ORDER — CLOPIDOGREL BISULFATE 300 MG/1
300 TABLET, FILM COATED ORAL
Status: COMPLETED | OUTPATIENT
Start: 2025-08-20 | End: 2025-08-20

## 2025-08-20 RX ORDER — QUETIAPINE FUMARATE 100 MG/1
100 TABLET, FILM COATED ORAL NIGHTLY
Status: DISCONTINUED | OUTPATIENT
Start: 2025-08-20 | End: 2025-08-22 | Stop reason: HOSPADM

## 2025-08-20 RX ORDER — NAPROXEN SODIUM 220 MG/1
81 TABLET, FILM COATED ORAL DAILY
Status: DISCONTINUED | OUTPATIENT
Start: 2025-08-21 | End: 2025-08-21

## 2025-08-20 RX ORDER — BUSPIRONE HYDROCHLORIDE 15 MG/1
15 TABLET ORAL NIGHTLY
COMMUNITY
Start: 2025-02-28

## 2025-08-20 RX ORDER — GABAPENTIN 300 MG/1
300 CAPSULE ORAL EVERY 12 HOURS
Status: DISCONTINUED | OUTPATIENT
Start: 2025-08-20 | End: 2025-08-22 | Stop reason: HOSPADM

## 2025-08-20 RX ORDER — SODIUM CHLORIDE 9 MG/ML
INJECTION, SOLUTION INTRAVENOUS CONTINUOUS
Status: DISCONTINUED | OUTPATIENT
Start: 2025-08-21 | End: 2025-08-21

## 2025-08-20 RX ORDER — KETOROLAC TROMETHAMINE 30 MG/ML
15 INJECTION, SOLUTION INTRAMUSCULAR; INTRAVENOUS
Status: COMPLETED | OUTPATIENT
Start: 2025-08-20 | End: 2025-08-20

## 2025-08-20 RX ADMIN — MORPHINE SULFATE 2 MG: 4 INJECTION, SOLUTION INTRAMUSCULAR; INTRAVENOUS at 12:08

## 2025-08-20 RX ADMIN — AMITRIPTYLINE HYDROCHLORIDE 50 MG: 25 TABLET, FILM COATED ORAL at 10:08

## 2025-08-20 RX ADMIN — IOHEXOL 75 ML: 350 INJECTION, SOLUTION INTRAVENOUS at 11:08

## 2025-08-20 RX ADMIN — ALUMINUM HYDROXIDE, MAGNESIUM HYDROXIDE, AND SIMETHICONE 30 ML: 200; 200; 20 SUSPENSION ORAL at 09:08

## 2025-08-20 RX ADMIN — ATORVASTATIN CALCIUM 40 MG: 40 TABLET, FILM COATED ORAL at 10:08

## 2025-08-20 RX ADMIN — KETOROLAC TROMETHAMINE 15 MG: 30 INJECTION, SOLUTION INTRAMUSCULAR; INTRAVENOUS at 10:08

## 2025-08-20 RX ADMIN — HEPARIN SODIUM 12 UNITS/KG/HR: 10000 INJECTION, SOLUTION INTRAVENOUS at 04:08

## 2025-08-20 RX ADMIN — LIDOCAINE HYDROCHLORIDE 15 ML: 20 SOLUTION ORAL at 09:08

## 2025-08-20 RX ADMIN — GABAPENTIN 300 MG: 300 CAPSULE ORAL at 10:08

## 2025-08-20 RX ADMIN — QUETIAPINE FUMARATE 100 MG: 100 TABLET ORAL at 11:08

## 2025-08-20 RX ADMIN — ASPIRIN 325 MG: 325 TABLET ORAL at 04:08

## 2025-08-20 RX ADMIN — MORPHINE SULFATE 2 MG: 4 INJECTION INTRAVENOUS at 09:08

## 2025-08-20 RX ADMIN — BUSPIRONE HYDROCHLORIDE 15 MG: 5 TABLET ORAL at 10:08

## 2025-08-20 RX ADMIN — CLOPIDOGREL BISULFATE 300 MG: 300 TABLET, FILM COATED ORAL at 04:08

## 2025-08-20 RX ADMIN — KETOROLAC TROMETHAMINE 30 MG: 30 INJECTION, SOLUTION INTRAMUSCULAR; INTRAVENOUS at 02:08

## 2025-08-21 PROBLEM — E66.9 OBESITY: Status: ACTIVE | Noted: 2025-08-21

## 2025-08-21 LAB
ABSOLUTE EOSINOPHIL (OHS): 0.3 K/UL
ABSOLUTE MONOCYTE (OHS): 0.57 K/UL (ref 0.3–1)
ABSOLUTE NEUTROPHIL COUNT (OHS): 4.66 K/UL (ref 1.8–7.7)
ALBUMIN SERPL BCP-MCNC: 3.1 G/DL (ref 3.5–5.2)
ALP SERPL-CCNC: 104 UNIT/L (ref 40–150)
ALT SERPL W/O P-5'-P-CCNC: 67 UNIT/L (ref 10–44)
ANION GAP (OHS): 10 MMOL/L (ref 8–16)
APTT PPP: 43.7 SECONDS (ref 21–32)
APTT PPP: 48.3 SECONDS (ref 21–32)
AST SERPL-CCNC: 49 UNIT/L (ref 11–45)
BASOPHILS # BLD AUTO: 0.04 K/UL
BASOPHILS NFR BLD AUTO: 0.5 %
BILIRUB SERPL-MCNC: 0.3 MG/DL (ref 0.1–1)
BUN SERPL-MCNC: 18 MG/DL (ref 6–20)
CALCIUM SERPL-MCNC: 8.8 MG/DL (ref 8.7–10.5)
CHLORIDE SERPL-SCNC: 103 MMOL/L (ref 95–110)
CO2 SERPL-SCNC: 25 MMOL/L (ref 23–29)
CREAT SERPL-MCNC: 0.9 MG/DL (ref 0.5–1.4)
ERYTHROCYTE [DISTWIDTH] IN BLOOD BY AUTOMATED COUNT: 13.9 % (ref 11.5–14.5)
GFR SERPLBLD CREATININE-BSD FMLA CKD-EPI: >60 ML/MIN/1.73/M2
GLUCOSE SERPL-MCNC: 110 MG/DL (ref 70–110)
HCT VFR BLD AUTO: 32.6 % (ref 37–48.5)
HGB BLD-MCNC: 10.4 GM/DL (ref 12–16)
HOLD SPECIMEN: NORMAL
IMM GRANULOCYTES # BLD AUTO: 0.03 K/UL (ref 0–0.04)
IMM GRANULOCYTES NFR BLD AUTO: 0.3 % (ref 0–0.5)
IRON SATN MFR SERPL: 26 % (ref 20–50)
IRON SERPL-MCNC: 96 UG/DL (ref 30–160)
LYMPHOCYTES # BLD AUTO: 3.21 K/UL (ref 1–4.8)
MCH RBC QN AUTO: 30.7 PG (ref 27–31)
MCHC RBC AUTO-ENTMCNC: 31.9 G/DL (ref 32–36)
MCV RBC AUTO: 96 FL (ref 82–98)
NUCLEATED RBC (/100WBC) (OHS): 0 /100 WBC
OHS CV CPX PATIENT HEIGHT IN: 62
PHOSPHATE SERPL-MCNC: 3.9 MG/DL (ref 2.7–4.5)
PLATELET # BLD AUTO: 243 K/UL (ref 150–450)
PMV BLD AUTO: 11.5 FL (ref 9.2–12.9)
POC ACTIVATED CLOTTING TIME K: 153 SEC (ref 74–137)
POTASSIUM SERPL-SCNC: 3.5 MMOL/L (ref 3.5–5.1)
PROT SERPL-MCNC: 6 GM/DL (ref 6–8.4)
RBC # BLD AUTO: 3.39 M/UL (ref 4–5.4)
RELATIVE EOSINOPHIL (OHS): 3.4 %
RELATIVE LYMPHOCYTE (OHS): 36.4 % (ref 18–48)
RELATIVE MONOCYTE (OHS): 6.5 % (ref 4–15)
RELATIVE NEUTROPHIL (OHS): 52.9 % (ref 38–73)
SAMPLE: ABNORMAL
SODIUM SERPL-SCNC: 138 MMOL/L (ref 136–145)
TIBC SERPL-MCNC: 370 UG/DL (ref 250–450)
TRANSFERRIN SERPL-MCNC: 250 MG/DL (ref 200–375)
WBC # BLD AUTO: 8.81 K/UL (ref 3.9–12.7)

## 2025-08-21 PROCEDURE — 99153 MOD SED SAME PHYS/QHP EA: CPT | Performed by: INTERNAL MEDICINE

## 2025-08-21 PROCEDURE — 25000003 PHARM REV CODE 250: Performed by: NURSE PRACTITIONER

## 2025-08-21 PROCEDURE — 63600175 PHARM REV CODE 636 W HCPCS: Performed by: INTERNAL MEDICINE

## 2025-08-21 PROCEDURE — 85025 COMPLETE CBC W/AUTO DIFF WBC: CPT | Performed by: EMERGENCY MEDICINE

## 2025-08-21 PROCEDURE — C1894 INTRO/SHEATH, NON-LASER: HCPCS | Performed by: INTERNAL MEDICINE

## 2025-08-21 PROCEDURE — 99152 MOD SED SAME PHYS/QHP 5/>YRS: CPT | Mod: ,,, | Performed by: INTERNAL MEDICINE

## 2025-08-21 PROCEDURE — 63600175 PHARM REV CODE 636 W HCPCS: Performed by: EMERGENCY MEDICINE

## 2025-08-21 PROCEDURE — 93458 L HRT ARTERY/VENTRICLE ANGIO: CPT | Mod: 26,,, | Performed by: INTERNAL MEDICINE

## 2025-08-21 PROCEDURE — 4A023N7 MEASUREMENT OF CARDIAC SAMPLING AND PRESSURE, LEFT HEART, PERCUTANEOUS APPROACH: ICD-10-PCS | Performed by: INTERNAL MEDICINE

## 2025-08-21 PROCEDURE — 36415 COLL VENOUS BLD VENIPUNCTURE: CPT | Performed by: STUDENT IN AN ORGANIZED HEALTH CARE EDUCATION/TRAINING PROGRAM

## 2025-08-21 PROCEDURE — C1887 CATHETER, GUIDING: HCPCS | Performed by: INTERNAL MEDICINE

## 2025-08-21 PROCEDURE — B2111ZZ FLUOROSCOPY OF MULTIPLE CORONARY ARTERIES USING LOW OSMOLAR CONTRAST: ICD-10-PCS | Performed by: INTERNAL MEDICINE

## 2025-08-21 PROCEDURE — 36415 COLL VENOUS BLD VENIPUNCTURE: CPT | Performed by: NURSE PRACTITIONER

## 2025-08-21 PROCEDURE — 99222 1ST HOSP IP/OBS MODERATE 55: CPT | Mod: ,,, | Performed by: NURSE PRACTITIONER

## 2025-08-21 PROCEDURE — 82040 ASSAY OF SERUM ALBUMIN: CPT | Performed by: NURSE PRACTITIONER

## 2025-08-21 PROCEDURE — 25500020 PHARM REV CODE 255: Performed by: INTERNAL MEDICINE

## 2025-08-21 PROCEDURE — 85730 THROMBOPLASTIN TIME PARTIAL: CPT | Performed by: STUDENT IN AN ORGANIZED HEALTH CARE EDUCATION/TRAINING PROGRAM

## 2025-08-21 PROCEDURE — 99152 MOD SED SAME PHYS/QHP 5/>YRS: CPT | Performed by: INTERNAL MEDICINE

## 2025-08-21 PROCEDURE — 85347 COAGULATION TIME ACTIVATED: CPT | Performed by: INTERNAL MEDICINE

## 2025-08-21 PROCEDURE — 25000003 PHARM REV CODE 250: Performed by: INTERNAL MEDICINE

## 2025-08-21 PROCEDURE — C1769 GUIDE WIRE: HCPCS | Performed by: INTERNAL MEDICINE

## 2025-08-21 PROCEDURE — 11000001 HC ACUTE MED/SURG PRIVATE ROOM

## 2025-08-21 PROCEDURE — 83540 ASSAY OF IRON: CPT | Performed by: STUDENT IN AN ORGANIZED HEALTH CARE EDUCATION/TRAINING PROGRAM

## 2025-08-21 PROCEDURE — 84100 ASSAY OF PHOSPHORUS: CPT | Performed by: NURSE PRACTITIONER

## 2025-08-21 PROCEDURE — 93458 L HRT ARTERY/VENTRICLE ANGIO: CPT | Performed by: INTERNAL MEDICINE

## 2025-08-21 RX ORDER — ENOXAPARIN SODIUM 100 MG/ML
40 INJECTION SUBCUTANEOUS EVERY 24 HOURS
Status: DISCONTINUED | OUTPATIENT
Start: 2025-08-21 | End: 2025-08-22 | Stop reason: HOSPADM

## 2025-08-21 RX ORDER — ASPIRIN 81 MG/1
81 TABLET ORAL DAILY
Status: DISCONTINUED | OUTPATIENT
Start: 2025-08-21 | End: 2025-08-21

## 2025-08-21 RX ORDER — MORPHINE SULFATE 4 MG/ML
2 INJECTION, SOLUTION INTRAMUSCULAR; INTRAVENOUS EVERY 10 MIN PRN
Refills: 0 | Status: DISCONTINUED | OUTPATIENT
Start: 2025-08-21 | End: 2025-08-21

## 2025-08-21 RX ORDER — LIDOCAINE HYDROCHLORIDE 10 MG/ML
INJECTION, SOLUTION INFILTRATION; PERINEURAL
Status: DISCONTINUED | OUTPATIENT
Start: 2025-08-21 | End: 2025-08-21 | Stop reason: HOSPADM

## 2025-08-21 RX ORDER — FENTANYL CITRATE 50 UG/ML
INJECTION, SOLUTION INTRAMUSCULAR; INTRAVENOUS
Status: DISCONTINUED | OUTPATIENT
Start: 2025-08-21 | End: 2025-08-21 | Stop reason: HOSPADM

## 2025-08-21 RX ORDER — PANTOPRAZOLE SODIUM 40 MG/10ML
40 INJECTION, POWDER, LYOPHILIZED, FOR SOLUTION INTRAVENOUS 2 TIMES DAILY
Status: DISCONTINUED | OUTPATIENT
Start: 2025-08-21 | End: 2025-08-21

## 2025-08-21 RX ORDER — ONDANSETRON 8 MG/1
8 TABLET, ORALLY DISINTEGRATING ORAL EVERY 8 HOURS PRN
Status: DISCONTINUED | OUTPATIENT
Start: 2025-08-21 | End: 2025-08-22 | Stop reason: HOSPADM

## 2025-08-21 RX ORDER — ATROPINE SULFATE 0.1 MG/ML
0.5 INJECTION INTRAVENOUS
Status: DISCONTINUED | OUTPATIENT
Start: 2025-08-21 | End: 2025-08-22 | Stop reason: HOSPADM

## 2025-08-21 RX ORDER — ACETAMINOPHEN 325 MG/1
650 TABLET ORAL EVERY 4 HOURS PRN
Status: DISCONTINUED | OUTPATIENT
Start: 2025-08-21 | End: 2025-08-22 | Stop reason: HOSPADM

## 2025-08-21 RX ORDER — OXYCODONE HYDROCHLORIDE 5 MG/1
5 TABLET ORAL EVERY 4 HOURS PRN
Refills: 0 | Status: DISCONTINUED | OUTPATIENT
Start: 2025-08-21 | End: 2025-08-22 | Stop reason: HOSPADM

## 2025-08-21 RX ORDER — DIPHENHYDRAMINE HCL 25 MG
50 CAPSULE ORAL ONCE
Status: COMPLETED | OUTPATIENT
Start: 2025-08-21 | End: 2025-08-21

## 2025-08-21 RX ORDER — MIDAZOLAM HYDROCHLORIDE 1 MG/ML
INJECTION INTRAMUSCULAR; INTRAVENOUS
Status: DISCONTINUED | OUTPATIENT
Start: 2025-08-21 | End: 2025-08-21 | Stop reason: HOSPADM

## 2025-08-21 RX ADMIN — ASPIRIN 81 MG CHEWABLE TABLET 81 MG: 81 TABLET CHEWABLE at 09:08

## 2025-08-21 RX ADMIN — GABAPENTIN 300 MG: 300 CAPSULE ORAL at 09:08

## 2025-08-21 RX ADMIN — DIPHENHYDRAMINE HYDROCHLORIDE 50 MG: 25 CAPSULE ORAL at 09:08

## 2025-08-21 RX ADMIN — SPIRONOLACTONE 100 MG: 25 TABLET ORAL at 09:08

## 2025-08-21 RX ADMIN — ATORVASTATIN CALCIUM 40 MG: 40 TABLET, FILM COATED ORAL at 09:08

## 2025-08-21 RX ADMIN — HEPARIN SODIUM 12 UNITS/KG/HR: 10000 INJECTION, SOLUTION INTRAVENOUS at 10:08

## 2025-08-21 RX ADMIN — SODIUM CHLORIDE: 9 INJECTION, SOLUTION INTRAVENOUS at 10:08

## 2025-08-21 RX ADMIN — BUSPIRONE HYDROCHLORIDE 15 MG: 5 TABLET ORAL at 09:08

## 2025-08-21 RX ADMIN — ENOXAPARIN SODIUM 40 MG: 40 INJECTION SUBCUTANEOUS at 04:08

## 2025-08-21 RX ADMIN — CLOPIDOGREL 75 MG: 75 TABLET ORAL at 09:08

## 2025-08-21 RX ADMIN — AMITRIPTYLINE HYDROCHLORIDE 50 MG: 25 TABLET, FILM COATED ORAL at 09:08

## 2025-08-21 RX ADMIN — SODIUM CHLORIDE: 9 INJECTION, SOLUTION INTRAVENOUS at 12:08

## 2025-08-21 RX ADMIN — ACETAMINOPHEN 650 MG: 325 TABLET ORAL at 09:08

## 2025-08-21 RX ADMIN — QUETIAPINE FUMARATE 100 MG: 100 TABLET ORAL at 09:08

## 2025-08-22 VITALS
HEIGHT: 62 IN | HEART RATE: 86 BPM | TEMPERATURE: 98 F | WEIGHT: 206.81 LBS | DIASTOLIC BLOOD PRESSURE: 70 MMHG | SYSTOLIC BLOOD PRESSURE: 123 MMHG | RESPIRATION RATE: 18 BRPM | BODY MASS INDEX: 38.06 KG/M2 | OXYGEN SATURATION: 95 %

## 2025-08-22 LAB
ALBUMIN SERPL BCP-MCNC: 3.3 G/DL (ref 3.5–5.2)
ALP SERPL-CCNC: 94 UNIT/L (ref 40–150)
ALT SERPL W/O P-5'-P-CCNC: 61 UNIT/L (ref 10–44)
ANION GAP (OHS): 10 MMOL/L (ref 8–16)
AST SERPL-CCNC: 36 UNIT/L (ref 11–45)
BILIRUB SERPL-MCNC: 0.4 MG/DL (ref 0.1–1)
BUN SERPL-MCNC: 14 MG/DL (ref 6–20)
CALCIUM SERPL-MCNC: 9.2 MG/DL (ref 8.7–10.5)
CHLORIDE SERPL-SCNC: 104 MMOL/L (ref 95–110)
CO2 SERPL-SCNC: 24 MMOL/L (ref 23–29)
CREAT SERPL-MCNC: 0.6 MG/DL (ref 0.5–1.4)
GFR SERPLBLD CREATININE-BSD FMLA CKD-EPI: >60 ML/MIN/1.73/M2
GLUCOSE SERPL-MCNC: 91 MG/DL (ref 70–110)
POTASSIUM SERPL-SCNC: 4.2 MMOL/L (ref 3.5–5.1)
PROT SERPL-MCNC: 6.3 GM/DL (ref 6–8.4)
SODIUM SERPL-SCNC: 138 MMOL/L (ref 136–145)

## 2025-08-22 PROCEDURE — 80053 COMPREHEN METABOLIC PANEL: CPT | Performed by: INTERNAL MEDICINE

## 2025-08-22 PROCEDURE — 36415 COLL VENOUS BLD VENIPUNCTURE: CPT | Performed by: INTERNAL MEDICINE

## 2025-08-22 PROCEDURE — 99232 SBSQ HOSP IP/OBS MODERATE 35: CPT | Mod: ,,, | Performed by: INTERNAL MEDICINE

## 2025-08-22 PROCEDURE — 25000003 PHARM REV CODE 250: Performed by: INTERNAL MEDICINE

## 2025-08-22 RX ORDER — OXYCODONE HYDROCHLORIDE 5 MG/1
5 TABLET ORAL EVERY 12 HOURS PRN
Qty: 6 TABLET | Refills: 0 | Status: SHIPPED | OUTPATIENT
Start: 2025-08-22

## 2025-08-22 RX ORDER — SPIRONOLACTONE 100 MG/1
100 TABLET, FILM COATED ORAL DAILY
Qty: 30 TABLET | Refills: 1 | Status: SHIPPED | OUTPATIENT
Start: 2025-08-22 | End: 2025-10-21

## 2025-08-22 RX ADMIN — GABAPENTIN 300 MG: 300 CAPSULE ORAL at 08:08

## 2025-08-26 ENCOUNTER — PATIENT OUTREACH (OUTPATIENT)
Dept: ADMINISTRATIVE | Facility: CLINIC | Age: 55
End: 2025-08-26
Payer: COMMERCIAL

## 2025-09-05 ENCOUNTER — TELEPHONE (OUTPATIENT)
Dept: NEPHROLOGY | Facility: CLINIC | Age: 55
End: 2025-09-05
Payer: COMMERCIAL

## 2025-09-05 DIAGNOSIS — R10.13 EPIGASTRIC PAIN: Primary | ICD-10-CM

## (undated) DEVICE — PAD RADI FEMORAL

## (undated) DEVICE — CATH JACKY RADIAL 3.5 110CM

## (undated) DEVICE — OMNIPAQUE CONTRAST 350MG/100ML

## (undated) DEVICE — WIRE GUIDE SAFE-T-J .035 260CM

## (undated) DEVICE — CATH JR5 4FR

## (undated) DEVICE — BAND TR COMP DEVICE REG 24CM

## (undated) DEVICE — PAD DEFIB CADENCE ADULT R2

## (undated) DEVICE — CATH ANG PIGTAIL 4FR INFINITY

## (undated) DEVICE — KIT MANIFOLD LOW PRESS TUBING

## (undated) DEVICE — Device

## (undated) DEVICE — KIT SYR REUSABLE

## (undated) DEVICE — SHEATH INTRODUCER 4FR PINNACLE

## (undated) DEVICE — ANGIOTOUCH KIT

## (undated) DEVICE — PACK CATH LAB

## (undated) DEVICE — CATH INFINITI 4F JL4 .042X100